# Patient Record
Sex: MALE | Race: WHITE | NOT HISPANIC OR LATINO | URBAN - METROPOLITAN AREA
[De-identification: names, ages, dates, MRNs, and addresses within clinical notes are randomized per-mention and may not be internally consistent; named-entity substitution may affect disease eponyms.]

---

## 2017-05-03 ENCOUNTER — OFFICE VISIT - HEALTHEAST (OUTPATIENT)
Dept: CARDIOLOGY | Facility: CLINIC | Age: 62
End: 2017-05-03

## 2017-05-03 DIAGNOSIS — I48.3 TYPICAL ATRIAL FLUTTER (H): ICD-10-CM

## 2017-05-03 DIAGNOSIS — N52.39 POST-PROCEDURAL ERECTILE DYSFUNCTION, UNSPECIFIED TYPE: ICD-10-CM

## 2017-05-03 DIAGNOSIS — I95.1 ORTHOSTATIC HYPOTENSION: ICD-10-CM

## 2017-05-03 ASSESSMENT — MIFFLIN-ST. JEOR: SCORE: 1682.1

## 2017-05-04 ENCOUNTER — COMMUNICATION - HEALTHEAST (OUTPATIENT)
Dept: CARDIOLOGY | Facility: CLINIC | Age: 62
End: 2017-05-04

## 2017-05-04 LAB
ATRIAL RATE - MUSE: 326 BPM
DIASTOLIC BLOOD PRESSURE - MUSE: NORMAL MMHG
INTERPRETATION ECG - MUSE: NORMAL
P AXIS - MUSE: NORMAL DEGREES
PR INTERVAL - MUSE: NORMAL MS
QRS DURATION - MUSE: 110 MS
QT - MUSE: 388 MS
QTC - MUSE: 427 MS
R AXIS - MUSE: 44 DEGREES
SYSTOLIC BLOOD PRESSURE - MUSE: NORMAL MMHG
T AXIS - MUSE: 69 DEGREES
VENTRICULAR RATE- MUSE: 73 BPM

## 2017-05-08 ENCOUNTER — COMMUNICATION - HEALTHEAST (OUTPATIENT)
Dept: CARDIOLOGY | Facility: CLINIC | Age: 62
End: 2017-05-08

## 2017-05-15 ENCOUNTER — HOSPITAL ENCOUNTER (OUTPATIENT)
Dept: CARDIOLOGY | Facility: HOSPITAL | Age: 62
Discharge: HOME | End: 2017-05-15
Attending: INTERNAL MEDICINE

## 2017-05-15 DIAGNOSIS — I95.1 ORTHOSTATIC HYPOTENSION: ICD-10-CM

## 2017-05-15 DIAGNOSIS — I48.3 TYPICAL ATRIAL FLUTTER (H): ICD-10-CM

## 2017-05-15 LAB
AORTIC ROOT: 3.7 CM
AORTIC VALVE MEAN VELOCITY: 53.6 CM/S
AV DIMENSIONLESS INDEX VTI: 0.8
AV MEAN GRADIENT: 1 MMHG
AV PEAK GRADIENT: 2.4 MMHG
AV VALVE AREA: 2.5 CM2
AV VELOCITY RATIO: 0.8
BSA FOR ECHO PROCEDURE: 2.08 M2
CV BLOOD PRESSURE: NORMAL MMHG
CV ECHO HEIGHT: 75 IN
CV ECHO WEIGHT: 180 LBS
DOP CALC AO PEAK VEL: 76.8 CM/S
DOP CALC AO VTI: 17.2 CM
DOP CALC LVOT AREA: 3.14 CM2
DOP CALC LVOT DIAMETER: 2 CM
DOP CALC LVOT PEAK VEL: 61.3 CM/S
DOP CALC LVOT STROKE VOLUME: 43.3 CM3
DOP CALCLVOT PEAK VEL VTI: 13.8 CM
ECHO EJECTION FRACTION ESTIMATED: 50 %
EJECTION FRACTION: 51 % (ref 55–75)
FRACTIONAL SHORTENING: 26.8 % (ref 28–44)
INTERVENTRICULAR SEPTUM IN END DIASTOLE: 0.99 CM (ref 0.6–1)
IVS/PW RATIO: 0.9
LA AREA 1: 15 CM2
LA AREA 2: 17 CM2
LEFT ATRIUM LENGTH: 4.1 CM
LEFT ATRIUM SIZE: 2.5 CM
LEFT ATRIUM TO AORTIC ROOT RATIO: 0.68 NO UNITS
LEFT ATRIUM VOLUME INDEX: 25.4 ML/M2
LEFT ATRIUM VOLUME: 52.9 CM3
LEFT VENTRICLE CARDIAC INDEX: 1.3 L/MIN/M2
LEFT VENTRICLE CARDIAC OUTPUT: 2.7 L/MIN
LEFT VENTRICLE DIASTOLIC VOLUME INDEX: 53.4 CM3/M2 (ref 34–74)
LEFT VENTRICLE DIASTOLIC VOLUME: 111 CM3 (ref 62–150)
LEFT VENTRICLE HEART RATE: 62 BPM
LEFT VENTRICLE MASS INDEX: 70.5 G/M2
LEFT VENTRICLE SYSTOLIC VOLUME INDEX: 26.3 CM3/M2 (ref 11–31)
LEFT VENTRICLE SYSTOLIC VOLUME: 54.7 CM3 (ref 21–61)
LEFT VENTRICULAR INTERNAL DIMENSION IN DIASTOLE: 4.21 CM (ref 4.2–5.8)
LEFT VENTRICULAR INTERNAL DIMENSION IN SYSTOLE: 3.08 CM (ref 2.5–4)
LEFT VENTRICULAR MASS: 146.7 G
LEFT VENTRICULAR OUTFLOW TRACT MEAN GRADIENT: 1 MMHG
LEFT VENTRICULAR OUTFLOW TRACT MEAN VELOCITY: 40.4 CM/S
LEFT VENTRICULAR OUTFLOW TRACT PEAK GRADIENT: 1 MMHG
LEFT VENTRICULAR POSTERIOR WALL IN END DIASTOLE: 1.1 CM (ref 0.6–1)
LV STROKE VOLUME INDEX: 20.8 ML/M2
MITRAL VALVE E/A RATIO: 1.3
MV AVERAGE E/E' RATIO: 7.2 CM/S
MV DECELERATION TIME: 187 MS
MV E'TISSUE VEL-LAT: 9.96 CM/S
MV E'TISSUE VEL-MED: 8.7 CM/S
MV LATERAL E/E' RATIO: 6.7
MV MEDIAL E/E' RATIO: 7.7
MV PEAK A VELOCITY: 53.4 CM/S
MV PEAK E VELOCITY: 66.9 CM/S
NUC REST DIASTOLIC VOLUME INDEX: 2880 LBS
NUC REST SYSTOLIC VOLUME INDEX: 75 IN
RIGHT VENTRICULAR INTERNAL DIMENSION IN DYSTOLE: 2.43 CM
TRICUSPID REGURGITATION PEAK PRESSURE GRADIENT: 12.1 MMHG
TRICUSPID VALVE ANULAR PLANE SYSTOLIC EXCURSION: 2.3 CM
TRICUSPID VALVE PEAK REGURGITANT VELOCITY: 174 CM/S

## 2017-05-15 ASSESSMENT — MIFFLIN-ST. JEOR: SCORE: 1682.1

## 2017-07-07 ENCOUNTER — OFFICE VISIT - HEALTHEAST (OUTPATIENT)
Dept: CARDIOLOGY | Facility: CLINIC | Age: 62
End: 2017-07-07

## 2017-07-07 DIAGNOSIS — M17.12 PRIMARY OSTEOARTHRITIS OF LEFT KNEE: ICD-10-CM

## 2017-07-07 DIAGNOSIS — I48.0 PAROXYSMAL ATRIAL FIBRILLATION (H): ICD-10-CM

## 2017-07-07 LAB
ATRIAL RATE - MUSE: 61 BPM
DIASTOLIC BLOOD PRESSURE - MUSE: NORMAL MMHG
INTERPRETATION ECG - MUSE: NORMAL
P AXIS - MUSE: 64 DEGREES
PR INTERVAL - MUSE: 190 MS
QRS DURATION - MUSE: 110 MS
QT - MUSE: 414 MS
QTC - MUSE: 416 MS
R AXIS - MUSE: 41 DEGREES
SYSTOLIC BLOOD PRESSURE - MUSE: NORMAL MMHG
T AXIS - MUSE: 65 DEGREES
VENTRICULAR RATE- MUSE: 61 BPM

## 2017-07-07 ASSESSMENT — MIFFLIN-ST. JEOR: SCORE: 1677.57

## 2017-12-18 ENCOUNTER — RECORDS - HEALTHEAST (OUTPATIENT)
Dept: LAB | Facility: CLINIC | Age: 62
End: 2017-12-18

## 2017-12-18 LAB — PSA SERPL-MCNC: 0.3 NG/ML (ref 0–4.5)

## 2017-12-22 RX ORDER — ALBUTEROL SULFATE 90 UG/1
2 AEROSOL, METERED RESPIRATORY (INHALATION) 4 TIMES DAILY PRN
COMMUNITY

## 2017-12-22 RX ORDER — VALACYCLOVIR HYDROCHLORIDE 500 MG/1
500 TABLET, FILM COATED ORAL EVERY 12 HOURS PRN
COMMUNITY

## 2017-12-26 ENCOUNTER — APPOINTMENT (OUTPATIENT)
Dept: GENERAL RADIOLOGY | Facility: CLINIC | Age: 62
End: 2017-12-26
Attending: PODIATRIST
Payer: COMMERCIAL

## 2017-12-26 ENCOUNTER — ANESTHESIA (OUTPATIENT)
Dept: SURGERY | Facility: CLINIC | Age: 62
End: 2017-12-26
Payer: COMMERCIAL

## 2017-12-26 ENCOUNTER — HOSPITAL ENCOUNTER (OUTPATIENT)
Facility: CLINIC | Age: 62
Discharge: HOME | End: 2017-12-26
Attending: PODIATRIST | Admitting: PODIATRIST
Payer: COMMERCIAL

## 2017-12-26 ENCOUNTER — ANESTHESIA EVENT (OUTPATIENT)
Dept: SURGERY | Facility: CLINIC | Age: 62
End: 2017-12-26
Payer: COMMERCIAL

## 2017-12-26 VITALS
HEART RATE: 53 BPM | DIASTOLIC BLOOD PRESSURE: 78 MMHG | HEIGHT: 74 IN | BODY MASS INDEX: 22.6 KG/M2 | SYSTOLIC BLOOD PRESSURE: 109 MMHG | RESPIRATION RATE: 16 BRPM | TEMPERATURE: 95.7 F | OXYGEN SATURATION: 100 % | WEIGHT: 176.1 LBS

## 2017-12-26 PROCEDURE — 36000060 ZZH SURGERY LEVEL 3 W FLUORO 1ST 30 MIN: Performed by: PODIATRIST

## 2017-12-26 PROCEDURE — 37000009 ZZH ANESTHESIA TECHNICAL FEE, EACH ADDTL 15 MIN: Performed by: PODIATRIST

## 2017-12-26 PROCEDURE — 25000125 ZZHC RX 250: Performed by: PODIATRIST

## 2017-12-26 PROCEDURE — 40000277 XR SURGERY CARM FLUORO LESS THAN 5 MIN W STILLS

## 2017-12-26 PROCEDURE — 71000012 ZZH RECOVERY PHASE 1 LEVEL 1 FIRST HR: Performed by: PODIATRIST

## 2017-12-26 PROCEDURE — 25000128 H RX IP 250 OP 636: Performed by: PODIATRIST

## 2017-12-26 PROCEDURE — 40000986 XR FOOT PORT LT 3 VW: Mod: LT

## 2017-12-26 PROCEDURE — 27210995 ZZH RX 272: Performed by: PODIATRIST

## 2017-12-26 PROCEDURE — 37000008 ZZH ANESTHESIA TECHNICAL FEE, 1ST 30 MIN: Performed by: PODIATRIST

## 2017-12-26 PROCEDURE — 27210794 ZZH OR GENERAL SUPPLY STERILE: Performed by: PODIATRIST

## 2017-12-26 PROCEDURE — 40000170 ZZH STATISTIC PRE-PROCEDURE ASSESSMENT II: Performed by: PODIATRIST

## 2017-12-26 PROCEDURE — 36000058 ZZH SURGERY LEVEL 3 EA 15 ADDTL MIN: Performed by: PODIATRIST

## 2017-12-26 PROCEDURE — 71000013 ZZH RECOVERY PHASE 1 LEVEL 1 EA ADDTL HR: Performed by: PODIATRIST

## 2017-12-26 PROCEDURE — 27110028 ZZH OR GENERAL SUPPLY NON-STERILE: Performed by: PODIATRIST

## 2017-12-26 PROCEDURE — 25000132 ZZH RX MED GY IP 250 OP 250 PS 637: Performed by: ANESTHESIOLOGY

## 2017-12-26 PROCEDURE — 71000027 ZZH RECOVERY PHASE 2 EACH 15 MINS: Performed by: PODIATRIST

## 2017-12-26 PROCEDURE — 25000128 H RX IP 250 OP 636: Performed by: NURSE ANESTHETIST, CERTIFIED REGISTERED

## 2017-12-26 RX ORDER — FENTANYL CITRATE 50 UG/ML
25-50 INJECTION, SOLUTION INTRAMUSCULAR; INTRAVENOUS EVERY 5 MIN PRN
Status: DISCONTINUED | OUTPATIENT
Start: 2017-12-26 | End: 2017-12-26 | Stop reason: HOSPADM

## 2017-12-26 RX ORDER — HYDROMORPHONE HYDROCHLORIDE 1 MG/ML
.3-.5 INJECTION, SOLUTION INTRAMUSCULAR; INTRAVENOUS; SUBCUTANEOUS EVERY 10 MIN PRN
Status: DISCONTINUED | OUTPATIENT
Start: 2017-12-26 | End: 2017-12-26 | Stop reason: HOSPADM

## 2017-12-26 RX ORDER — ACETAMINOPHEN 500 MG
1000 TABLET ORAL ONCE
Status: COMPLETED | OUTPATIENT
Start: 2017-12-26 | End: 2017-12-26

## 2017-12-26 RX ORDER — ONDANSETRON 4 MG/1
4 TABLET, ORALLY DISINTEGRATING ORAL EVERY 30 MIN PRN
Status: DISCONTINUED | OUTPATIENT
Start: 2017-12-26 | End: 2017-12-26 | Stop reason: HOSPADM

## 2017-12-26 RX ORDER — PROPOFOL 10 MG/ML
INJECTION, EMULSION INTRAVENOUS CONTINUOUS PRN
Status: DISCONTINUED | OUTPATIENT
Start: 2017-12-26 | End: 2017-12-26

## 2017-12-26 RX ORDER — ONDANSETRON 2 MG/ML
4 INJECTION INTRAMUSCULAR; INTRAVENOUS EVERY 30 MIN PRN
Status: DISCONTINUED | OUTPATIENT
Start: 2017-12-26 | End: 2017-12-26 | Stop reason: HOSPADM

## 2017-12-26 RX ORDER — BUPIVACAINE HYDROCHLORIDE AND EPINEPHRINE 5; 5 MG/ML; UG/ML
INJECTION, SOLUTION PERINEURAL PRN
Status: DISCONTINUED | OUTPATIENT
Start: 2017-12-26 | End: 2017-12-26 | Stop reason: HOSPADM

## 2017-12-26 RX ORDER — FENTANYL CITRATE 50 UG/ML
INJECTION, SOLUTION INTRAMUSCULAR; INTRAVENOUS PRN
Status: DISCONTINUED | OUTPATIENT
Start: 2017-12-26 | End: 2017-12-26

## 2017-12-26 RX ORDER — MAGNESIUM HYDROXIDE 1200 MG/15ML
LIQUID ORAL PRN
Status: DISCONTINUED | OUTPATIENT
Start: 2017-12-26 | End: 2017-12-26 | Stop reason: HOSPADM

## 2017-12-26 RX ORDER — PROPOFOL 10 MG/ML
INJECTION, EMULSION INTRAVENOUS PRN
Status: DISCONTINUED | OUTPATIENT
Start: 2017-12-26 | End: 2017-12-26

## 2017-12-26 RX ORDER — NALOXONE HYDROCHLORIDE 0.4 MG/ML
.1-.4 INJECTION, SOLUTION INTRAMUSCULAR; INTRAVENOUS; SUBCUTANEOUS
Status: DISCONTINUED | OUTPATIENT
Start: 2017-12-26 | End: 2017-12-26 | Stop reason: HOSPADM

## 2017-12-26 RX ORDER — SODIUM CHLORIDE, SODIUM LACTATE, POTASSIUM CHLORIDE, CALCIUM CHLORIDE 600; 310; 30; 20 MG/100ML; MG/100ML; MG/100ML; MG/100ML
INJECTION, SOLUTION INTRAVENOUS CONTINUOUS
Status: DISCONTINUED | OUTPATIENT
Start: 2017-12-26 | End: 2017-12-26 | Stop reason: HOSPADM

## 2017-12-26 RX ORDER — SODIUM CHLORIDE, SODIUM LACTATE, POTASSIUM CHLORIDE, CALCIUM CHLORIDE 600; 310; 30; 20 MG/100ML; MG/100ML; MG/100ML; MG/100ML
INJECTION, SOLUTION INTRAVENOUS CONTINUOUS PRN
Status: DISCONTINUED | OUTPATIENT
Start: 2017-12-26 | End: 2017-12-26

## 2017-12-26 RX ORDER — PHYSOSTIGMINE SALICYLATE 1 MG/ML
1.2 INJECTION INTRAVENOUS
Status: DISCONTINUED | OUTPATIENT
Start: 2017-12-26 | End: 2017-12-26 | Stop reason: HOSPADM

## 2017-12-26 RX ORDER — FENTANYL CITRATE 50 UG/ML
25-50 INJECTION, SOLUTION INTRAMUSCULAR; INTRAVENOUS
Status: DISCONTINUED | OUTPATIENT
Start: 2017-12-26 | End: 2017-12-26 | Stop reason: HOSPADM

## 2017-12-26 RX ORDER — MEPERIDINE HYDROCHLORIDE 25 MG/ML
12.5 INJECTION INTRAMUSCULAR; INTRAVENOUS; SUBCUTANEOUS
Status: DISCONTINUED | OUTPATIENT
Start: 2017-12-26 | End: 2017-12-26 | Stop reason: HOSPADM

## 2017-12-26 RX ORDER — CEFAZOLIN SODIUM 2 G/100ML
2 INJECTION, SOLUTION INTRAVENOUS
Status: COMPLETED | OUTPATIENT
Start: 2017-12-26 | End: 2017-12-26

## 2017-12-26 RX ORDER — ONDANSETRON 2 MG/ML
INJECTION INTRAMUSCULAR; INTRAVENOUS PRN
Status: DISCONTINUED | OUTPATIENT
Start: 2017-12-26 | End: 2017-12-26

## 2017-12-26 RX ORDER — LIDOCAINE HYDROCHLORIDE 10 MG/ML
INJECTION, SOLUTION INFILTRATION; PERINEURAL
Status: DISCONTINUED
Start: 2017-12-26 | End: 2017-12-26 | Stop reason: WASHOUT

## 2017-12-26 RX ORDER — BUPIVACAINE HYDROCHLORIDE AND EPINEPHRINE 5; 5 MG/ML; UG/ML
INJECTION, SOLUTION EPIDURAL; INTRACAUDAL; PERINEURAL
Status: DISCONTINUED
Start: 2017-12-26 | End: 2017-12-26 | Stop reason: HOSPADM

## 2017-12-26 RX ADMIN — PROPOFOL 100 MCG/KG/MIN: 10 INJECTION, EMULSION INTRAVENOUS at 09:28

## 2017-12-26 RX ADMIN — FENTANYL CITRATE 50 MCG: 50 INJECTION, SOLUTION INTRAMUSCULAR; INTRAVENOUS at 09:28

## 2017-12-26 RX ADMIN — ACETAMINOPHEN 1000 MG: 500 TABLET, FILM COATED ORAL at 11:35

## 2017-12-26 RX ADMIN — PROPOFOL 20 MG: 10 INJECTION, EMULSION INTRAVENOUS at 09:28

## 2017-12-26 RX ADMIN — SODIUM CHLORIDE, POTASSIUM CHLORIDE, SODIUM LACTATE AND CALCIUM CHLORIDE: 600; 310; 30; 20 INJECTION, SOLUTION INTRAVENOUS at 09:26

## 2017-12-26 RX ADMIN — PROPOFOL 50 MCG/KG/MIN: 10 INJECTION, EMULSION INTRAVENOUS at 10:15

## 2017-12-26 RX ADMIN — MIDAZOLAM 2 MG: 1 INJECTION INTRAMUSCULAR; INTRAVENOUS at 09:28

## 2017-12-26 RX ADMIN — CEFAZOLIN SODIUM 2 G: 2 INJECTION, SOLUTION INTRAVENOUS at 09:33

## 2017-12-26 RX ADMIN — ONDANSETRON 4 MG: 2 INJECTION INTRAMUSCULAR; INTRAVENOUS at 10:21

## 2017-12-26 ASSESSMENT — ENCOUNTER SYMPTOMS: DYSRHYTHMIAS: 1

## 2017-12-26 NOTE — ANESTHESIA CARE TRANSFER NOTE
Patient: Jesus Hendricks    Procedure(s):  EXCISION TIBIAL SESAMOID LEFT FOOT   - Wound Class: I-Clean    Diagnosis: TIBIAL SESAMOIDITIS LEFT FOOT  Diagnosis Additional Information: No value filed.    Anesthesia Type:   MAC     Note:  Airway :Face Mask  Patient transferred to:PACU  Comments: Transferred to PACU, spontaneous respirations, 10L oxygen via facemask.  All monitors and alarms on and functioning, VSS.  Patient awake, comfortable.  Report to PACU RN.Handoff Report: Identifed the Patient, Identified the Reponsible Provider, Reviewed the pertinent medical history, Discussed the surgical course, Reviewed Intra-OP anesthesia mangement and issues during anesthesia, Set expectations for post-procedure period and Allowed opportunity for questions and acknowledgement of understanding      Vitals: (Last set prior to Anesthesia Care Transfer)    CRNA VITALS  12/26/2017 0958 - 12/26/2017 1034      12/26/2017             Pulse: (!)  48    SpO2: 98 %    Resp Rate (set): 10                Electronically Signed By: JIM Carvalho CRNA  December 26, 2017  10:34 AM

## 2017-12-26 NOTE — IP AVS SNAPSHOT
MRN:[id number]                      After Visit Summary   12/26/2017    Jesus Hendricks    MRN: [id number]           Thank you!     Thank you for choosing [facility] for your care. Our goal is always to provide you with excellent care. Hearing back from our patients is one way we can continue to improve our services. Please take a few minutes to complete the written survey that you may receive in the mail after you visit with us. Thank you!        Patient Information     Date Of Birth          1955        About your hospital stay     You were admitted on:  December 26, 2017 You last received care in the:  [facility] Same Day Surgery    You were discharged on:  December 26, 2017       Who to Call     For medical emergencies, please call 911.  For non-urgent questions about your medical care, please call your primary care provider or clinic, [phone]  For questions related to your surgery, please call your surgery clinic        Attending Provider     Provider Candi Claudio DPM Podiatry       Primary Care Provider Office Phone # Fax #    Piero Chaves [phone]      After Care Instructions     Discharge Instructions       Do minimal walking for the first week; where surgical shoe and leave bandages clean, dry and intact. Use ice for twenty minutes every hour. Bear weight only on heel for the first three days. Take pain medication before pain begins.    Call my cell phone with any questions or concerns: [phone]  Call the office to schedule a follow up for one week: [phone]            Elevate affected extremity           Ice to affected area       Ice pack to affected area PRN (as needed).                  Further instructions from your care team       Same Day Surgery Discharge Instructions for  Sedation and General Anesthesia       It's not unusual to feel dizzy, light-headed or faint for up to 24 hours after surgery or while  taking pain medication.  If you have these symptoms: sit for a few minutes before standing and have someone assist you when you get up to walk or use the bathroom.      You should rest and relax for the next 24 hours. We recommend you make arrangements to have an adult stay with you for at least 24 hours after your discharge.  Avoid hazardous and strenuous activity.      DO NOT DRIVE any vehicle or operate mechanical equipment for 24 hours following the end of your surgery.  Even though you may feel normal, your reactions may be affected by the medication you have received.      Do not drink alcoholic beverages for 24 hours following surgery.       Slowly progress to your regular diet as you feel able. It's not unusual to feel nauseated and/or vomit after receiving anesthesia.  If you develop these symptoms, drink clear liquids (apple juice, ginger ale, broth, 7-up, etc. ) until you feel better.  If your nausea and vomiting persists for 24 hours, please notify your surgeon.        All narcotic pain medications, along with inactivity and anesthesia, can cause constipation. Drinking plenty of liquids and increasing fiber intake will help.      For any questions of a medical nature, call your surgeon.      Do not make important decisions for 24 hours.      If you had general anesthesia, you may have a sore throat for a couple of days related to the breathing tube used during surgery.  You may use Cepacol lozenges to help with this discomfort.  If it worsens or if you develop a fever, contact your surgeon.       If you feel your pain is not well managed with the pain medications prescribed by your surgeon, please contact your surgeon's office to let them know so they can address your concerns.     **If you have questions or concerns about your procedure,  call Dr. Saeed at [phone]**    Reasons to contact your surgeon:    1. Signs of possible infection: Check your incision daily for redness, swelling, warmth,  "red streaks or foul drainage.   2. Elevated temperature.  3. Pain not controlled with pain medication and/or rest.   4. Uncontrolled nausea or vomiting.  5. Any questions or concerns.   6.     While you were at the hospital today you were given 1000 mg of Tylenol at 11:35 . The recommended daily maximum dose is 4000 mg.      Pending Results     Date and Time Order Name Status Description    2017 1101 XR Foot Port Left 3 Views In process     2017 1035 XR Surgery BOYD L/T 5 Min Fluoro w Stills In process     2017 0000 EKG CARDIAC - HIM SCAN Preliminary             Admission Information     Date & Time Provider Department Dept. Phone    2017 Candi Saeed DPM [facility] Same Day Surgery [phone]      Your Vitals Were     Blood Pressure Pulse Temperature Respirations Height Weight    100/72 53 94.6  F (34.8  C) 12 1.88 m (6' 2\") 79.9 kg (176 lb 1.6 oz)    Pulse Oximetry BMI (Body Mass Index)                100% 22.61 kg/m2          Orgdot Information     Orgdot lets you send messages to your doctor, view your test results, renew your prescriptions, schedule appointments and more. To sign up, go to [email or web address] . Click on \"Log in\" on the left side of the screen, which will take you to the Welcome page. Then click on \"Sign up Now\" on the right side of the page.     You will be asked to enter the access code listed below, as well as some personal information. Please follow the directions to create your username and password.     Your access code is: [email or web address]  Expires: 3/26/2018 10:58 AM     Your access code will  in 90 days. If you need help or a new code, please call your [facility] clinic or [phone].        Care EveryWhere ID     This is your Care EveryWhere ID. This could be used by other organizations to access your [facility] medical records  [id number]        Equal Access to Services     LAUREN FRAZIER AH: pamela Drew " annaginny mynor moran, dinora mancini. So [facility] [phone].    ATENCIÓN: Si brennen wallace, tiene a sheridan disposición servicios gratuitos de asistencia lingüística. Alyce al [phone].    We comply with applicable federal civil rights laws and [place] laws. We do not discriminate on the basis of race, color, national origin, age, disability, sex, sexual orientation, or gender identity.               Review of your medicines      UNREVIEWED medicines. Ask your doctor about these medicines        Dose / Directions    ZOLPIDEM TARTRATE PO        Dose:  10 mg   Take 10 mg by mouth nightly as needed for sleep   Refills:  0         CONTINUE these medicines which have NOT CHANGED        Dose / Directions    albuterol 108 (90 BASE) MCG/ACT Inhaler   Commonly known as:  PROAIR HFA/PROVENTIL HFA/VENTOLIN HFA        Dose:  2 puff   Inhale 2 puffs into the lungs 4 times daily as needed for shortness of breath / dyspnea or wheezing   Refills:  0       SILDENAFIL CITRATE PO        Dose:   mg   Take  mg by mouth daily as needed   Refills:  0       valACYclovir 500 MG tablet   Commonly known as:  VALTREX        Dose:  500 mg   Take 500 mg by mouth every 12 hours as needed   Refills:  0       VITAMIN D (CHOLECALCIFEROL) PO        Dose:  1000 Units   Take 1,000 Units by mouth daily   Refills:  0       ZOMIG PO        Dose:  5 mg   Take 5 mg by mouth 2 times daily as needed for migraine   Refills:  0               ANTIBIOTIC INSTRUCTION     You've Been Prescribed an Antibiotic - Now What?  Your healthcare team thinks that you or your loved one might have an infection. Some infections can be treated with antibiotics, which are powerful, life-saving drugs. Like all medications, antibiotics have side effects and should only be used when necessary. There are some important things you should know about your antibiotic treatment.      Your healthcare team may run tests before you start  taking an antibiotic.    Your team may take samples (e.g., from your blood, urine or other areas) to run tests to look for bacteria. These test can be important to determine if you need an antibiotic at all and, if you do, which antibiotic will work best.      Within a few days, your healthcare team might change or even stop your antibiotic.    Your team may start you on an antibiotic while they are working to find out what is making you sick.    Your team might change your antibiotic because test results show that a different antibiotic would be better to treat your infection.    In some cases, once your team has more information, they learn that you do not need an antibiotic at all. They may find out that you don't have an infection, or that the antibiotic you're taking won't work against your infection. For example, an infection caused by a virus can't be treated with antibiotics. Staying on an antibiotic when you don't need it is more likely to be harmful than helpful.      You may experience side effects from your antibiotic.    Like all medications, antibiotics have side effects. Some of these can be serious.    Let you healthcare team know if you have any known allergies when you are admitted to the hospital.    One significant side effect of nearly all antibiotics is the risk of severe and sometimes deadly diarrhea caused by Clostridium difficile (C. Difficile). This occurs when a person takes antibiotics because some good germs are destroyed. Antibiotic use allows C. diificile to take over, putting patients at high risk for this serious infection.    As a patient or caregiver, it is important to understand your or your loved one's antibiotic treatment. It is especially important for caregivers to speak up when patients can't speak for themselves. Here are some important questions to ask your healthcare team.    What infection is this antibiotic treating and how do you know I have that infection?    What  side effects might occur from this antibiotic?    How long will I need to take this antibiotic?    Is it safe to take this antibiotic with other medications or supplements (e.g., vitamins) that I am taking?     Are there any special directions I need to know about taking this antibiotic? For example, should I take it with food?    How will I be monitored to know whether my infection is responding to the antibiotic?    What tests may help to make sure the right antibiotic is prescribed for me?      Information provided by:  www.cdc.gov/getsmart  U.S. Department of Health and Human Services  Centers for disease Control and Prevention  National Center for Emerging and Zoonotic Infectious Diseases  Division of Healthcare Quality Promotion         Protect others around you: Learn how to safely use, store and throw away your medicines at www.disposemymeds.org.             Medication List: This is a list of all your medications and when to take them. Check marks below indicate your daily home schedule. Keep this list as a reference.      Medications           Morning Afternoon Evening Bedtime As Needed    albuterol 108 (90 BASE) MCG/ACT Inhaler   Commonly known as:  PROAIR HFA/PROVENTIL HFA/VENTOLIN HFA   Inhale 2 puffs into the lungs 4 times daily as needed for shortness of breath / dyspnea or wheezing                                SILDENAFIL CITRATE PO   Take  mg by mouth daily as needed                                valACYclovir 500 MG tablet   Commonly known as:  VALTREX   Take 500 mg by mouth every 12 hours as needed                                VITAMIN D (CHOLECALCIFEROL) PO   Take 1,000 Units by mouth daily                                ZOLPIDEM TARTRATE PO   Take 10 mg by mouth nightly as needed for sleep                                ZOMIG PO   Take 5 mg by mouth 2 times daily as needed for migraine

## 2017-12-26 NOTE — OR NURSING
Patient states that Dr. Saeed was going to send prescription for injectable blood thinner to [facility] on [address]. Contacted Dr. Saeed, she stated she will send from her office, she is there now. Relayed this information to patient and family member.

## 2017-12-26 NOTE — ANESTHESIA POSTPROCEDURE EVALUATION
Patient: Jesus Hendricks    Procedure(s):  EXCISION TIBIAL SESAMOID LEFT FOOT   - Wound Class: I-Clean    Diagnosis:TIBIAL SESAMOIDITIS LEFT FOOT  Diagnosis Additional Information: No value filed.    Anesthesia Type:  MAC    Note:  Anesthesia Post Evaluation    Patient location during evaluation: PACU  Patient participation: Able to fully participate in evaluation  Level of consciousness: awake  Pain management: adequate  Airway patency: patent  Cardiovascular status: acceptable  Respiratory status: acceptable  Hydration status: acceptable  PONV: controlled     Anesthetic complications: None          Last vitals:  Vitals:    12/26/17 1031 12/26/17 1045 12/26/17 1100   BP: 106/73 111/76 100/72   Pulse:      Resp: 10 10 12   Temp: 36.6  C (97.9  F) 35  C (95  F) 34.8  C (94.6  F)   SpO2: 100% 100% 100%         Electronically Signed By: Kendrick Huff MD  December 26, 2017  11:10 AM

## 2017-12-26 NOTE — OR NURSING
PNDS met, po per I&O sheet. Pt dressed, up in recliner and transported to Phase 2. Post op shoe on

## 2017-12-26 NOTE — ANESTHESIA PREPROCEDURE EVALUATION
Anesthesia Evaluation     . Pt has had prior anesthetic. Type: General           ROS/MED HX    ENT/Pulmonary:       Neurologic:     (+)migraines,     Cardiovascular:     (+) ----. : . . . :. dysrhythmias a-fib, .       METS/Exercise Tolerance:     Hematologic:         Musculoskeletal:         GI/Hepatic:         Renal/Genitourinary:         Endo:         Psychiatric:         Infectious Disease:         Malignancy:         Other:                                    Anesthesia Plan      History & Physical Review  History and physical reviewed and following examination; no interval change.    ASA Status:  2 .        Plan for MAC with Intravenous induction. Maintenance will be TIVA.    PONV prophylaxis:  Ondansetron (or other 5HT-3) and Dexamethasone or Solumedrol  Propofol, Zofran, and decadron      Postoperative Care  Postoperative pain management:  IV analgesics and Oral pain medications.      Consents  Anesthetic plan, risks, benefits and alternatives discussed with:  Patient..                          .

## 2017-12-26 NOTE — IP AVS SNAPSHOT
[facility] Same Day Surgery    [address]    [place] [address]    Phone:  [phone]    Fax:  [phone]                                       After Visit Summary   12/26/2017    Jesus Hendricks    MRN: [id number]           After Visit Summary Signature Page     I have received my discharge instructions, and my questions have been answered. I have discussed any challenges I see with this plan with the nurse or doctor.    ..........................................................................................................................................  Patient/Patient Representative Signature      ..........................................................................................................................................  Patient Representative Print Name and Relationship to Patient    ..................................................               ................................................  Date                                            Time    ..........................................................................................................................................  Reviewed by Signature/Title    ...................................................              ..............................................  Date                                                            Time

## 2017-12-26 NOTE — DISCHARGE INSTRUCTIONS
Same Day Surgery Discharge Instructions for  Sedation and General Anesthesia       It's not unusual to feel dizzy, light-headed or faint for up to 24 hours after surgery or while taking pain medication.  If you have these symptoms: sit for a few minutes before standing and have someone assist you when you get up to walk or use the bathroom.      You should rest and relax for the next 24 hours. We recommend you make arrangements to have an adult stay with you for at least 24 hours after your discharge.  Avoid hazardous and strenuous activity.      DO NOT DRIVE any vehicle or operate mechanical equipment for 24 hours following the end of your surgery.  Even though you may feel normal, your reactions may be affected by the medication you have received.      Do not drink alcoholic beverages for 24 hours following surgery.       Slowly progress to your regular diet as you feel able. It's not unusual to feel nauseated and/or vomit after receiving anesthesia.  If you develop these symptoms, drink clear liquids (apple juice, ginger ale, broth, 7-up, etc. ) until you feel better.  If your nausea and vomiting persists for 24 hours, please notify your surgeon.        All narcotic pain medications, along with inactivity and anesthesia, can cause constipation. Drinking plenty of liquids and increasing fiber intake will help.      For any questions of a medical nature, call your surgeon.      Do not make important decisions for 24 hours.      If you had general anesthesia, you may have a sore throat for a couple of days related to the breathing tube used during surgery.  You may use Cepacol lozenges to help with this discomfort.  If it worsens or if you develop a fever, contact your surgeon.       If you feel your pain is not well managed with the pain medications prescribed by your surgeon, please contact your surgeon's office to let them know so they can address your concerns.     **If you have questions or concerns about your  procedure,  call Dr. Saeed at [phone]**    Reasons to contact your surgeon:    1. Signs of possible infection: Check your incision daily for redness, swelling, warmth, red streaks or foul drainage.   2. Elevated temperature.  3. Pain not controlled with pain medication and/or rest.   4. Uncontrolled nausea or vomiting.  5. Any questions or concerns.   6.     While you were at the hospital today you were given 1000 mg of Tylenol at 11:35 . The recommended daily maximum dose is 4000 mg.

## 2017-12-27 NOTE — OP NOTE
DATE OF PROCEDURE:  12/26/2017       PREOPERATIVE DIAGNOSIS:  Tibial sesamoiditis, left foot.      POSTOPERATIVE DIAGNOSIS:  Tibial sesamoiditis, left foot.      PROCEDURE:  Excision tibial sesamoid, left foot.      ANESTHESIA:  Monitored anesthesia care with local anesthetic consisting of 10 mL of 0.5% Marcaine with epinephrine.      ESTIMATED BLOOD LOSS:  1  mL.      HEMOSTASIS:  Pneumatic ankle tourniquet at 250 mmHg for 43 minutes.      MATERIALS:  3-0 Vicryl, 4-0 Prolene.      INJECTABLES:  An additional 10 mL of 0.5% Marcaine with epinephrine were injected.      COMPLICATIONS:  None.      INDICATIONS FOR PROCEDURE:  Jesus Hendricks is a 62-year-old male with a chronic history of pain at the ball of his left foot, which was first noticed during yoga, being barefooted.  He has a high arch foot and bears weight under the big toe.  There is point tenderness at the location of the tibial sesamoid, and this was confirmed with x-ray.      DESCRIPTION OF PROCEDURE:  The patient was identified and brought into the operating room and placed on the operating table in the supine position.  After the induction of anesthesia, the left foot was scrubbed, prepped and draped in the usual sterile technique.  An approximately 3 cm linear longitudinal incision was made over the area of the medial first metatarsophalangeal joint, located at the tibial sesamoid.  This dissection was taken down through the subcutaneous layers into the fatty area, and at this point the dissection was taken down to the level of the tibial sesamoid.  Using soft tissue dissection, tibial sesamoid was removed from the operative field, and this was confirmed under fluoroscopy.  At this time fluid was passively drained from this area, approximately 2cc of clear pink nonviscous fluid. The site was then irrigated with copious amounts of normal sterile saline.  The subcutaneous layers were closed with 3-0 Vicryl.  The skin was closed with 4-0 nylon.  Dressings  included Xeroform, 4x4 gauze and an Ace bandage.  The patient tolerated the procedures and the anesthesia well and was transported to recovery with vital signs stable and vascular status intact to the left foot.        Following a period of postoperative monitoring, the patient will be discharged home with postoperative instructions and will follow up with my office in 1 week.         BHUPINDER VINCENT DPM             D: 2017 10:31   T: 2017 22:15   MT: [id number]      Name:     DANIEL GALE   MRN:      [id number]        Account:        [id number]   :      1955           Procedure Date: 2017      Document: [id number]

## 2018-02-02 ENCOUNTER — RECORDS - HEALTHEAST (OUTPATIENT)
Dept: LAB | Facility: CLINIC | Age: 63
End: 2018-02-02

## 2018-02-02 LAB
BASOPHILS # BLD AUTO: 0.1 THOU/UL (ref 0–0.2)
BASOPHILS NFR BLD AUTO: 1 % (ref 0–2)
C REACTIVE PROTEIN LHE: <0.1 MG/DL (ref 0–0.8)
EOSINOPHIL # BLD AUTO: 0.2 THOU/UL (ref 0–0.4)
EOSINOPHIL NFR BLD AUTO: 3 % (ref 0–6)
ERYTHROCYTE [DISTWIDTH] IN BLOOD BY AUTOMATED COUNT: 12.3 % (ref 11–14.5)
ERYTHROCYTE [SEDIMENTATION RATE] IN BLOOD BY WESTERGREN METHOD: 2 MM/HR (ref 0–15)
HCT VFR BLD AUTO: 44.7 % (ref 40–54)
HGB BLD-MCNC: 14.7 G/DL (ref 14–18)
LYMPHOCYTES # BLD AUTO: 1.1 THOU/UL (ref 0.8–4.4)
LYMPHOCYTES NFR BLD AUTO: 17 % (ref 20–40)
MCH RBC QN AUTO: 31.1 PG (ref 27–34)
MCHC RBC AUTO-ENTMCNC: 32.9 G/DL (ref 32–36)
MCV RBC AUTO: 95 FL (ref 80–100)
MONOCYTES # BLD AUTO: 0.8 THOU/UL (ref 0–0.9)
MONOCYTES NFR BLD AUTO: 11 % (ref 2–10)
NEUTROPHILS # BLD AUTO: 4.6 THOU/UL (ref 2–7.7)
NEUTROPHILS NFR BLD AUTO: 68 % (ref 50–70)
PLATELET # BLD AUTO: 267 THOU/UL (ref 140–440)
PMV BLD AUTO: 9.9 FL (ref 8.5–12.5)
RBC # BLD AUTO: 4.73 MILL/UL (ref 4.4–6.2)
URATE SERPL-MCNC: 6.1 MG/DL (ref 3–8)
WBC: 6.8 THOU/UL (ref 4–11)

## 2018-06-07 ENCOUNTER — COMMUNICATION - HEALTHEAST (OUTPATIENT)
Dept: CARDIOLOGY | Facility: CLINIC | Age: 63
End: 2018-06-07

## 2018-07-31 ENCOUNTER — RECORDS - HEALTHEAST (OUTPATIENT)
Dept: ADMINISTRATIVE | Facility: OTHER | Age: 63
End: 2018-07-31

## 2018-08-02 ENCOUNTER — OFFICE VISIT - HEALTHEAST (OUTPATIENT)
Dept: CARDIOLOGY | Facility: CLINIC | Age: 63
End: 2018-08-02

## 2018-08-02 DIAGNOSIS — N52.39 POST-PROCEDURAL ERECTILE DYSFUNCTION, UNSPECIFIED TYPE: ICD-10-CM

## 2018-08-02 DIAGNOSIS — I95.1 ORTHOSTATIC HYPOTENSION: ICD-10-CM

## 2018-08-02 DIAGNOSIS — I48.0 PAROXYSMAL ATRIAL FIBRILLATION (H): ICD-10-CM

## 2018-08-02 ASSESSMENT — MIFFLIN-ST. JEOR: SCORE: 1682.1

## 2018-08-08 ENCOUNTER — AMBULATORY - HEALTHEAST (OUTPATIENT)
Dept: CARDIOLOGY | Facility: CLINIC | Age: 63
End: 2018-08-08

## 2018-08-08 ENCOUNTER — HOSPITAL ENCOUNTER (OUTPATIENT)
Dept: CARDIOLOGY | Facility: HOSPITAL | Age: 63
Discharge: HOME | End: 2018-08-08
Attending: INTERNAL MEDICINE

## 2018-08-08 DIAGNOSIS — I48.0 PAROXYSMAL ATRIAL FIBRILLATION (H): ICD-10-CM

## 2018-08-10 ENCOUNTER — COMMUNICATION - HEALTHEAST (OUTPATIENT)
Dept: CARDIOLOGY | Facility: CLINIC | Age: 63
End: 2018-08-10

## 2018-10-02 ENCOUNTER — RECORDS - HEALTHEAST (OUTPATIENT)
Dept: LAB | Facility: CLINIC | Age: 63
End: 2018-10-02

## 2018-10-05 LAB — BACTERIA SPEC CULT: ABNORMAL

## 2018-12-12 ENCOUNTER — RECORDS - HEALTHEAST (OUTPATIENT)
Dept: LAB | Facility: CLINIC | Age: 63
End: 2018-12-12

## 2018-12-12 LAB
ALBUMIN SERPL-MCNC: 3.7 G/DL (ref 3.5–5)
ALP SERPL-CCNC: 66 U/L (ref 45–120)
ALT SERPL W P-5'-P-CCNC: 16 U/L (ref 0–45)
ANION GAP SERPL CALCULATED.3IONS-SCNC: 9 MMOL/L (ref 5–18)
AST SERPL W P-5'-P-CCNC: 24 U/L (ref 0–40)
BASOPHILS # BLD AUTO: 0.1 THOU/UL (ref 0–0.2)
BASOPHILS NFR BLD AUTO: 1 % (ref 0–2)
BILIRUB SERPL-MCNC: 2.3 MG/DL (ref 0–1)
BUN SERPL-MCNC: 13 MG/DL (ref 8–22)
CALCIUM SERPL-MCNC: 9.3 MG/DL (ref 8.5–10.5)
CHLORIDE BLD-SCNC: 105 MMOL/L (ref 98–107)
CHOLEST SERPL-MCNC: 151 MG/DL
CO2 SERPL-SCNC: 25 MMOL/L (ref 22–31)
CREAT SERPL-MCNC: 0.9 MG/DL (ref 0.7–1.3)
EOSINOPHIL # BLD AUTO: 0.1 THOU/UL (ref 0–0.4)
EOSINOPHIL NFR BLD AUTO: 3 % (ref 0–6)
ERYTHROCYTE [DISTWIDTH] IN BLOOD BY AUTOMATED COUNT: 12.2 % (ref 11–14.5)
FASTING STATUS PATIENT QL REPORTED: NORMAL
GFR SERPL CREATININE-BSD FRML MDRD: >60 ML/MIN/1.73M2
GLUCOSE BLD-MCNC: 85 MG/DL (ref 70–125)
HCT VFR BLD AUTO: 46.2 % (ref 40–54)
HDLC SERPL-MCNC: 54 MG/DL
HGB BLD-MCNC: 15.2 G/DL (ref 14–18)
LDLC SERPL CALC-MCNC: 83 MG/DL
LYMPHOCYTES # BLD AUTO: 1.2 THOU/UL (ref 0.8–4.4)
LYMPHOCYTES NFR BLD AUTO: 23 % (ref 20–40)
MCH RBC QN AUTO: 31 PG (ref 27–34)
MCHC RBC AUTO-ENTMCNC: 32.9 G/DL (ref 32–36)
MCV RBC AUTO: 94 FL (ref 80–100)
MONOCYTES # BLD AUTO: 0.5 THOU/UL (ref 0–0.9)
MONOCYTES NFR BLD AUTO: 10 % (ref 2–10)
NEUTROPHILS # BLD AUTO: 3.3 THOU/UL (ref 2–7.7)
NEUTROPHILS NFR BLD AUTO: 64 % (ref 50–70)
PLATELET # BLD AUTO: 343 THOU/UL (ref 140–440)
PMV BLD AUTO: 9.5 FL (ref 8.5–12.5)
POTASSIUM BLD-SCNC: 4.9 MMOL/L (ref 3.5–5)
PROT SERPL-MCNC: 5.8 G/DL (ref 6–8)
PSA SERPL-MCNC: 0.4 NG/ML (ref 0–4.5)
RBC # BLD AUTO: 4.91 MILL/UL (ref 4.4–6.2)
SODIUM SERPL-SCNC: 139 MMOL/L (ref 136–145)
TRIGL SERPL-MCNC: 68 MG/DL
TSH SERPL DL<=0.005 MIU/L-ACNC: 2.96 UIU/ML (ref 0.3–5)
WBC: 5.2 THOU/UL (ref 4–11)

## 2019-07-10 ENCOUNTER — COMMUNICATION - HEALTHEAST (OUTPATIENT)
Dept: CARDIOLOGY | Facility: CLINIC | Age: 64
End: 2019-07-10

## 2019-07-10 DIAGNOSIS — I48.91 ATRIAL FIBRILLATION (H): ICD-10-CM

## 2019-12-20 ENCOUNTER — RECORDS - HEALTHEAST (OUTPATIENT)
Dept: LAB | Facility: CLINIC | Age: 64
End: 2019-12-20

## 2019-12-20 LAB
ALBUMIN SERPL-MCNC: 3.9 G/DL (ref 3.5–5)
ALP SERPL-CCNC: 80 U/L (ref 45–120)
ALT SERPL W P-5'-P-CCNC: 16 U/L (ref 0–45)
ANION GAP SERPL CALCULATED.3IONS-SCNC: 7 MMOL/L (ref 5–18)
AST SERPL W P-5'-P-CCNC: 24 U/L (ref 0–40)
BASOPHILS # BLD AUTO: 0.1 THOU/UL (ref 0–0.2)
BASOPHILS NFR BLD AUTO: 1 % (ref 0–2)
BILIRUB SERPL-MCNC: 2.3 MG/DL (ref 0–1)
BUN SERPL-MCNC: 21 MG/DL (ref 8–22)
CALCIUM SERPL-MCNC: 9.7 MG/DL (ref 8.5–10.5)
CHLORIDE BLD-SCNC: 104 MMOL/L (ref 98–107)
CHOLEST SERPL-MCNC: 160 MG/DL
CO2 SERPL-SCNC: 29 MMOL/L (ref 22–31)
CREAT SERPL-MCNC: 1.01 MG/DL (ref 0.7–1.3)
EOSINOPHIL # BLD AUTO: 0.2 THOU/UL (ref 0–0.4)
EOSINOPHIL NFR BLD AUTO: 3 % (ref 0–6)
ERYTHROCYTE [DISTWIDTH] IN BLOOD BY AUTOMATED COUNT: 12.2 % (ref 11–14.5)
FASTING STATUS PATIENT QL REPORTED: ABNORMAL
GFR SERPL CREATININE-BSD FRML MDRD: >60 ML/MIN/1.73M2
GLUCOSE BLD-MCNC: 73 MG/DL (ref 70–125)
HCT VFR BLD AUTO: 47.5 % (ref 40–54)
HDLC SERPL-MCNC: 46 MG/DL
HGB BLD-MCNC: 15.8 G/DL (ref 14–18)
LDLC SERPL CALC-MCNC: 71 MG/DL
LYMPHOCYTES # BLD AUTO: 1.2 THOU/UL (ref 0.8–4.4)
LYMPHOCYTES NFR BLD AUTO: 20 % (ref 20–40)
MCH RBC QN AUTO: 31.2 PG (ref 27–34)
MCHC RBC AUTO-ENTMCNC: 33.3 G/DL (ref 32–36)
MCV RBC AUTO: 94 FL (ref 80–100)
MONOCYTES # BLD AUTO: 0.5 THOU/UL (ref 0–0.9)
MONOCYTES NFR BLD AUTO: 8 % (ref 2–10)
NEUTROPHILS # BLD AUTO: 4.3 THOU/UL (ref 2–7.7)
NEUTROPHILS NFR BLD AUTO: 68 % (ref 50–70)
PLATELET # BLD AUTO: 323 THOU/UL (ref 140–440)
PMV BLD AUTO: 9.7 FL (ref 8.5–12.5)
POTASSIUM BLD-SCNC: 5.1 MMOL/L (ref 3.5–5)
PROT SERPL-MCNC: 6.2 G/DL (ref 6–8)
PSA SERPL-MCNC: 0.4 NG/ML (ref 0–4.5)
RBC # BLD AUTO: 5.07 MILL/UL (ref 4.4–6.2)
SODIUM SERPL-SCNC: 140 MMOL/L (ref 136–145)
TRIGL SERPL-MCNC: 213 MG/DL
TSH SERPL DL<=0.005 MIU/L-ACNC: 2.18 UIU/ML (ref 0.3–5)
WBC: 6.2 THOU/UL (ref 4–11)

## 2020-10-19 ENCOUNTER — COMMUNICATION - HEALTHEAST (OUTPATIENT)
Dept: CARDIOLOGY | Facility: CLINIC | Age: 65
End: 2020-10-19

## 2020-10-20 ENCOUNTER — RECORDS - HEALTHEAST (OUTPATIENT)
Dept: ADMINISTRATIVE | Facility: OTHER | Age: 65
End: 2020-10-20

## 2020-10-20 ENCOUNTER — OFFICE VISIT - HEALTHEAST (OUTPATIENT)
Dept: CARDIOLOGY | Facility: CLINIC | Age: 65
End: 2020-10-20

## 2020-10-20 ENCOUNTER — AMBULATORY - HEALTHEAST (OUTPATIENT)
Dept: CARDIOLOGY | Facility: CLINIC | Age: 65
End: 2020-10-20

## 2020-10-20 DIAGNOSIS — I48.0 PAROXYSMAL ATRIAL FIBRILLATION (H): ICD-10-CM

## 2020-10-20 DIAGNOSIS — I95.1 ORTHOSTATIC HYPOTENSION: ICD-10-CM

## 2020-10-20 ASSESSMENT — MIFFLIN-ST. JEOR: SCORE: 1713.86

## 2020-10-21 ENCOUNTER — COMMUNICATION - HEALTHEAST (OUTPATIENT)
Dept: CARDIOLOGY | Facility: CLINIC | Age: 65
End: 2020-10-21

## 2020-10-21 DIAGNOSIS — I48.0 PAROXYSMAL ATRIAL FIBRILLATION (H): ICD-10-CM

## 2020-10-21 DIAGNOSIS — I25.5 ISCHEMIC CARDIOMYOPATHY: ICD-10-CM

## 2020-10-21 LAB
ATRIAL RATE - MUSE: 105 BPM
DIASTOLIC BLOOD PRESSURE - MUSE: NORMAL
INTERPRETATION ECG - MUSE: NORMAL
P AXIS - MUSE: NORMAL
PR INTERVAL - MUSE: NORMAL
QRS DURATION - MUSE: 96 MS
QT - MUSE: 356 MS
QTC - MUSE: 440 MS
R AXIS - MUSE: 31 DEGREES
SYSTOLIC BLOOD PRESSURE - MUSE: NORMAL
T AXIS - MUSE: 52 DEGREES
VENTRICULAR RATE- MUSE: 92 BPM

## 2020-10-22 ENCOUNTER — AMBULATORY - HEALTHEAST (OUTPATIENT)
Dept: CARDIOLOGY | Facility: CLINIC | Age: 65
End: 2020-10-22

## 2020-11-02 ENCOUNTER — COMMUNICATION - HEALTHEAST (OUTPATIENT)
Dept: CARDIOLOGY | Facility: CLINIC | Age: 65
End: 2020-11-02

## 2020-11-02 DIAGNOSIS — I48.0 PAROXYSMAL ATRIAL FIBRILLATION (H): ICD-10-CM

## 2020-11-09 ENCOUNTER — COMMUNICATION - HEALTHEAST (OUTPATIENT)
Dept: CARDIOLOGY | Facility: CLINIC | Age: 65
End: 2020-11-09

## 2020-11-10 ENCOUNTER — AMBULATORY - HEALTHEAST (OUTPATIENT)
Dept: CARDIOLOGY | Facility: CLINIC | Age: 65
End: 2020-11-10

## 2020-11-10 DIAGNOSIS — I48.0 PAROXYSMAL ATRIAL FIBRILLATION (H): ICD-10-CM

## 2020-11-10 LAB
ATRIAL RATE - MUSE: 55 BPM
DIASTOLIC BLOOD PRESSURE - MUSE: NORMAL
INTERPRETATION ECG - MUSE: NORMAL
P AXIS - MUSE: 54 DEGREES
PR INTERVAL - MUSE: 190 MS
QRS DURATION - MUSE: 110 MS
QT - MUSE: 438 MS
QTC - MUSE: 419 MS
R AXIS - MUSE: 48 DEGREES
SYSTOLIC BLOOD PRESSURE - MUSE: NORMAL
T AXIS - MUSE: 69 DEGREES
VENTRICULAR RATE- MUSE: 55 BPM

## 2020-11-10 ASSESSMENT — MIFFLIN-ST. JEOR: SCORE: 1709.32

## 2020-11-11 ENCOUNTER — AMBULATORY - HEALTHEAST (OUTPATIENT)
Dept: CARDIOLOGY | Facility: CLINIC | Age: 65
End: 2020-11-11

## 2020-11-11 ENCOUNTER — COMMUNICATION - HEALTHEAST (OUTPATIENT)
Dept: CARDIOLOGY | Facility: CLINIC | Age: 65
End: 2020-11-11

## 2020-11-11 DIAGNOSIS — I48.0 PAROXYSMAL ATRIAL FIBRILLATION (H): ICD-10-CM

## 2020-11-25 ENCOUNTER — COMMUNICATION - HEALTHEAST (OUTPATIENT)
Dept: CARDIOLOGY | Facility: CLINIC | Age: 65
End: 2020-11-25

## 2020-11-30 ENCOUNTER — HOSPITAL ENCOUNTER (OUTPATIENT)
Dept: CARDIOLOGY | Facility: HOSPITAL | Age: 65
Discharge: HOME | End: 2020-11-30
Attending: INTERNAL MEDICINE

## 2020-11-30 DIAGNOSIS — I48.0 PAROXYSMAL ATRIAL FIBRILLATION (H): ICD-10-CM

## 2020-12-08 ENCOUNTER — COMMUNICATION - HEALTHEAST (OUTPATIENT)
Dept: CARDIOLOGY | Facility: CLINIC | Age: 65
End: 2020-12-08

## 2020-12-09 ENCOUNTER — COMMUNICATION - HEALTHEAST (OUTPATIENT)
Dept: CARDIOLOGY | Facility: CLINIC | Age: 65
End: 2020-12-09

## 2020-12-09 DIAGNOSIS — I48.0 PAROXYSMAL ATRIAL FIBRILLATION (H): ICD-10-CM

## 2020-12-22 ENCOUNTER — AMBULATORY - HEALTHEAST (OUTPATIENT)
Dept: CARDIOLOGY | Facility: CLINIC | Age: 65
End: 2020-12-22

## 2020-12-29 ENCOUNTER — COMMUNICATION - HEALTHEAST (OUTPATIENT)
Dept: CARDIOLOGY | Facility: CLINIC | Age: 65
End: 2020-12-29

## 2020-12-29 DIAGNOSIS — I48.0 PAROXYSMAL ATRIAL FIBRILLATION (H): ICD-10-CM

## 2021-01-08 ENCOUNTER — RECORDS - HEALTHEAST (OUTPATIENT)
Dept: LAB | Facility: CLINIC | Age: 66
End: 2021-01-08

## 2021-01-08 LAB
ALBUMIN SERPL-MCNC: 3.9 G/DL (ref 3.5–5)
ALP SERPL-CCNC: 89 U/L (ref 45–120)
ALT SERPL W P-5'-P-CCNC: 19 U/L (ref 0–45)
ANION GAP SERPL CALCULATED.3IONS-SCNC: 11 MMOL/L (ref 5–18)
AST SERPL W P-5'-P-CCNC: 26 U/L (ref 0–40)
BILIRUB SERPL-MCNC: 1.6 MG/DL (ref 0–1)
BUN SERPL-MCNC: 14 MG/DL (ref 8–22)
CALCIUM SERPL-MCNC: 9.1 MG/DL (ref 8.5–10.5)
CHLORIDE BLD-SCNC: 106 MMOL/L (ref 98–107)
CHOLEST SERPL-MCNC: 182 MG/DL
CO2 SERPL-SCNC: 24 MMOL/L (ref 22–31)
CREAT SERPL-MCNC: 0.9 MG/DL (ref 0.7–1.3)
FASTING STATUS PATIENT QL REPORTED: NORMAL
GFR SERPL CREATININE-BSD FRML MDRD: >60 ML/MIN/1.73M2
GLUCOSE BLD-MCNC: 98 MG/DL (ref 70–125)
HDLC SERPL-MCNC: 49 MG/DL
LDLC SERPL CALC-MCNC: 113 MG/DL
POTASSIUM BLD-SCNC: 4.5 MMOL/L (ref 3.5–5)
PROT SERPL-MCNC: 6.3 G/DL (ref 6–8)
PSA SERPL-MCNC: 0.4 NG/ML (ref 0–4.5)
SODIUM SERPL-SCNC: 141 MMOL/L (ref 136–145)
TRIGL SERPL-MCNC: 102 MG/DL
TSH SERPL DL<=0.005 MIU/L-ACNC: 3.23 UIU/ML (ref 0.3–5)

## 2021-01-11 ENCOUNTER — HOSPITAL ENCOUNTER (OUTPATIENT)
Dept: CARDIOLOGY | Facility: HOSPITAL | Age: 66
Discharge: HOME | End: 2021-01-11
Attending: INTERNAL MEDICINE

## 2021-01-11 DIAGNOSIS — I25.5 ISCHEMIC CARDIOMYOPATHY: ICD-10-CM

## 2021-01-11 DIAGNOSIS — I48.0 PAROXYSMAL ATRIAL FIBRILLATION (H): ICD-10-CM

## 2021-01-11 LAB
AORTIC ROOT: 3.4 CM
AORTIC VALVE MEAN VELOCITY: 69 CM/S
ASCENDING AORTA: 4.2 CM
AV DIMENSIONLESS INDEX VTI: 0.7
AV MEAN GRADIENT: 2 MMHG
AV PEAK GRADIENT: 4.2 MMHG
AV VALVE AREA: 3.4 CM2
AV VELOCITY RATIO: 0.8
BSA FOR ECHO PROCEDURE: 2.11 M2
CV BLOOD PRESSURE: ABNORMAL MMHG
CV ECHO HEIGHT: 75 IN
CV ECHO WEIGHT: 186 LBS
DOP CALC AO PEAK VEL: 102 CM/S
DOP CALC AO VTI: 21.1 CM
DOP CALC LVOT AREA: 4.91 CM2
DOP CALC LVOT DIAMETER: 2.5 CM
DOP CALC LVOT PEAK VEL: 76.9 CM/S
DOP CALC LVOT STROKE VOLUME: 72.6 CM3
DOP CALCLVOT PEAK VEL VTI: 14.8 CM
EJECTION FRACTION: 55 % (ref 55–75)
FRACTIONAL SHORTENING: 31.8 % (ref 28–44)
INTERVENTRICULAR SEPTUM IN END DIASTOLE: 1.17 CM (ref 0.6–1)
IVS/PW RATIO: 1.1
LA AREA 1: 17.1 CM2
LA AREA 2: 15.8 CM2
LEFT ATRIUM LENGTH: 4.8 CM
LEFT ATRIUM VOLUME INDEX: 22.7 ML/M2
LEFT ATRIUM VOLUME: 47.8 ML
LEFT VENTRICLE CARDIAC INDEX: 2.2 L/MIN/M2
LEFT VENTRICLE CARDIAC OUTPUT: 4.6 L/MIN
LEFT VENTRICLE DIASTOLIC VOLUME INDEX: 38.1 CM3/M2 (ref 34–74)
LEFT VENTRICLE DIASTOLIC VOLUME: 80.4 CM3 (ref 62–150)
LEFT VENTRICLE HEART RATE: 64 BPM
LEFT VENTRICLE MASS INDEX: 82.3 G/M2
LEFT VENTRICLE SYSTOLIC VOLUME INDEX: 17.1 CM3/M2 (ref 11–31)
LEFT VENTRICLE SYSTOLIC VOLUME: 36.1 CM3 (ref 21–61)
LEFT VENTRICULAR INTERNAL DIMENSION IN DIASTOLE: 4.44 CM (ref 4.2–5.8)
LEFT VENTRICULAR INTERNAL DIMENSION IN SYSTOLE: 3.03 CM (ref 2.5–4)
LEFT VENTRICULAR MASS: 173.6 G
LEFT VENTRICULAR OUTFLOW TRACT MEAN GRADIENT: 1 MMHG
LEFT VENTRICULAR OUTFLOW TRACT MEAN VELOCITY: 48.8 CM/S
LEFT VENTRICULAR OUTFLOW TRACT PEAK GRADIENT: 2 MMHG
LEFT VENTRICULAR POSTERIOR WALL IN END DIASTOLE: 1.05 CM (ref 0.6–1)
LV STROKE VOLUME INDEX: 34.4 ML/M2
MITRAL VALVE E/A RATIO: 1.5
MV AVERAGE E/E' RATIO: 6.8 CM/S
MV DECELERATION TIME: 268 MS
MV E'TISSUE VEL-LAT: 11 CM/S
MV E'TISSUE VEL-MED: 9.57 CM/S
MV LATERAL E/E' RATIO: 6.3
MV MEDIAL E/E' RATIO: 7.3
MV PEAK A VELOCITY: 47.5 CM/S
MV PEAK E VELOCITY: 69.8 CM/S
NUC REST DIASTOLIC VOLUME INDEX: 2976 LBS
NUC REST SYSTOLIC VOLUME INDEX: 75 IN
TRICUSPID REGURGITATION PEAK PRESSURE GRADIENT: 17.8 MMHG
TRICUSPID VALVE ANULAR PLANE SYSTOLIC EXCURSION: 2.5 CM
TRICUSPID VALVE PEAK REGURGITANT VELOCITY: 211 CM/S

## 2021-01-11 ASSESSMENT — MIFFLIN-ST. JEOR: SCORE: 1709.32

## 2021-01-13 ENCOUNTER — COMMUNICATION - HEALTHEAST (OUTPATIENT)
Dept: CARDIOLOGY | Facility: CLINIC | Age: 66
End: 2021-01-13

## 2021-01-13 DIAGNOSIS — I48.0 PAROXYSMAL ATRIAL FIBRILLATION (H): ICD-10-CM

## 2021-01-15 ENCOUNTER — AMBULATORY - HEALTHEAST (OUTPATIENT)
Dept: CARDIOLOGY | Facility: CLINIC | Age: 66
End: 2021-01-15

## 2021-01-15 ENCOUNTER — RECORDS - HEALTHEAST (OUTPATIENT)
Dept: ADMINISTRATIVE | Facility: OTHER | Age: 66
End: 2021-01-15

## 2021-02-03 ENCOUNTER — COMMUNICATION - HEALTHEAST (OUTPATIENT)
Dept: CARDIOLOGY | Facility: CLINIC | Age: 66
End: 2021-02-03

## 2021-05-30 NOTE — TELEPHONE ENCOUNTER
Called pt to discuss concerns. Pt states he has been feeling lightheaded the past couple weeks. He went to the drug store to get his BP checked and it was 84/64. He is currently taking 30 mg of Sildenafil about once a week. Pt attempted to make FU appointment with [facility] however he would like afternoon appointment so his wife can accompany him. Pt states he stays well hydrated drinking almost a gallon of water a day.           Dr. Hicks, pt complains of lightheadedness related to low BP. His BP at the drug store was 84/64. Last seen 8/2/18, pt attempted to schedule follow-up however he wasn't able to find a suitable date/time.

## 2021-05-30 NOTE — TELEPHONE ENCOUNTER
Pt returned call. Reviewed [facility] recommendations. Pt agreeable to plan. Echo order placed. Pt will call scheduling to arrange. -kcl

## 2021-05-30 NOTE — TELEPHONE ENCOUNTER
From: Carley Hicks MD  Sent: 7/10/2019   2:59 PM  To: Tali Zambrano, DANETTE  With suggest appointment with primary since he has no significant heart issues and probably easier to get into see them.  In interim we should at least check echocardiogram.  I defer to primary whether they would like to start him on some Midrin.  LF      Called and LM for pt to return call to discuss- kcl

## 2021-05-30 NOTE — TELEPHONE ENCOUNTER
----- Message from Karen Sravan sent at 7/10/2019 10:02 AM CDT -----  Contact: Pt  General phone call:    Caller: Pt  Primary cardiologist: Kurt  Detailed reason for call: Pt states he was lightheaded earlier and took his BP and it was very low. Wants to know how to proceed. Please advise.  Best phone number: [phone]  Best time to contact: Any  Ok to leave a detailed message? Yes  Device? No    Additional Info:

## 2021-05-31 VITALS — HEIGHT: 75 IN | BODY MASS INDEX: 22.38 KG/M2 | WEIGHT: 180 LBS

## 2021-05-31 VITALS — WEIGHT: 180 LBS | HEIGHT: 75 IN | BODY MASS INDEX: 22.38 KG/M2

## 2021-05-31 VITALS — BODY MASS INDEX: 22.26 KG/M2 | HEIGHT: 75 IN | WEIGHT: 179 LBS

## 2021-06-01 VITALS — HEIGHT: 75 IN | BODY MASS INDEX: 22.38 KG/M2 | WEIGHT: 180 LBS

## 2021-06-05 VITALS
HEIGHT: 75 IN | DIASTOLIC BLOOD PRESSURE: 78 MMHG | HEART RATE: 58 BPM | WEIGHT: 186 LBS | RESPIRATION RATE: 14 BRPM | SYSTOLIC BLOOD PRESSURE: 110 MMHG | BODY MASS INDEX: 23.13 KG/M2

## 2021-06-05 VITALS
BODY MASS INDEX: 23.25 KG/M2 | SYSTOLIC BLOOD PRESSURE: 96 MMHG | HEART RATE: 88 BPM | RESPIRATION RATE: 16 BRPM | DIASTOLIC BLOOD PRESSURE: 50 MMHG | HEIGHT: 75 IN | WEIGHT: 187 LBS

## 2021-06-05 VITALS — WEIGHT: 186 LBS | BODY MASS INDEX: 23.13 KG/M2 | HEIGHT: 75 IN

## 2021-06-10 NOTE — PROGRESS NOTES
[facility] Follow-up Note    Assessment & Plan        1. Typical atrial flutter -he had this in past and was seen by electrophysiology, he was offered ablation but he declined this.  I do not see any recurrence of this.     2.  Atrial fibrillation- appears to be not valvular and symptomatic in that he has lightheadedness as well as palpitations.  I think would be beneficial if we try to get him back in the rhythm.  I will start him on Eliquis 5 mg of mouth twice a day, check echo and if echo shows no structural issues and I know he had a normal stress echo placed on flecainide 50 mg twice a day, and arrange for outpatient cardioversion.  Hopefully this will help his blood pressure.     3. Orthostatic hypotension -I have asked him to stay well-hydrated which she does, I suspect however this is most likely due to atrial fibrillation and thus the reason to get back into rhythm.     4.  Erectile dysfunction-uncertain why the Viagra did not work, unless was due to low blood pressure in the atrial fibrillation.  He has had hypospadias repaired and I am wondering if there is some structural defect there and suggested follow-up with his urologist.       Plan  1.  Start Eliquis 5 mg twice a day.  2.  Needs echocardiogram.  3.  No structural heart disease flecainide 50 mg by mouth twice a day.   4.  Will need to arrange for outpatient cardioversion.  5.  Follow-up with me about 3 months or sooner if needed.    Subjective  CC: 61-year-old white gentleman here for six-month follow-up today.  Since I seen him he has had no sustained episodes of palpitations.  Does feel an occasional skipped come and go.  These could be may be of the day or so.  He does have some generalized orthostatic lightheadedness if he gets up quickly.  There is no PND, orthopnea, chest discomfort, or peripheral edema.    Medications  Current Outpatient Prescriptions   Medication Sig Note     aspirin 325 MG tablet Take 162.5 mg by mouth  "daily.       PROTEIN SUPPLEMENT (PROTEIN ORAL) Take by mouth daily.      valACYclovir (VALTREX) 500 MG tablet  5/20/2016: Received from: [facility]     VIAGRA 100 mg tablet TK 1 T PO ONE HOUR PRIOR TO INTERCOURSE. 5/20/2016: Received from: External Pharmacy     ZOLMitriptan (ZOMIG) 5 MG tablet Take 5 mg by mouth as needed for migraine.      amoxicillin (AMOXIL) 500 MG tablet Take 2,000 mg by mouth. 5/20/2016: Received from: [facility]       Objective  BP 90/62 (Patient Site: Left Arm, Patient Position: Sitting, Cuff Size: Adult Regular)  Pulse 76  Resp 16  Ht 6' 3\" (1.905 m)  Wt 180 lb (81.6 kg)  BMI 22.5 kg/m2    General Appearance:    Alert, cooperative, no distress, appears stated age   Head:    Normocephalic, without obvious abnormality, atraumatic   Throat:   Lips, mucosa, and tongue normal; teeth and gums normal   Neck:   Supple, symmetrical, trachea midline, no adenopathy;        thyroid:  No enlargement/tenderness/nodules; no carotid    bruit or JVD   Back:     Symmetric, no curvature, ROM normal, no CVA tenderness   Lungs:     Clear to auscultation bilaterally, respirations unlabored   Chest wall:    No tenderness or deformity   Heart:   irregularly irregular, S1 and S2 normal, no murmur, rub   or gallop   Abdomen:     Soft, non-tender, bowel sounds active all four quadrants,     no masses, no organomegaly   Extremities:   Normal, atraumatic, no cyanosis or edema   Pulses:   2+ and symmetric all extremities   Skin:   Skin color, texture, turgor normal, no rashes or lesions     Results    Lab Results personally reviewed   Lab Results   Component Value Date    CHOL 183 12/12/2016     Lab Results   Component Value Date    HDL 52 12/12/2016     Lab Results   Component Value Date    LDLCALC 108 12/12/2016     Lab Results   Component Value Date    TRIG 115 12/12/2016     No results found for: WBC, HGB, HCT, PLT  Lab Results   Component Value Date    CREATININE 0.97 " 12/12/2016    BUN 15 12/12/2016     12/12/2016    K 4.9 12/12/2016    CO2 28 12/12/2016     Reviewed electrocardiogram fibrillation, low voltage, slow ventricular response, no acute changes.    Review of Systems:   General: WNL  Eyes: WNL  Ears/Nose/Throat: WNL  Lungs: WNL  Heart: WNL  Stomach: WNL  Bladder: WNL  Muscle/Joints: WNL  Skin: WNL  Nervous System: WNL  Mental Health: WNL     Blood: WNL

## 2021-06-11 NOTE — PROGRESS NOTES
[facility] Follow-up Note    Assessment & Plan        1. Paroxysmal atrial fibrillation - appears to be not valvular and symptomatic in that he has lightheadedness as well as palpitations.  Apparently he goes in and out of this without his knowledge and his CHADS 2 VASC score is 0 and see me as he is in sinus rhythm now I will not start him on Eliquis 5 mg of mouth twice a day.  If he has recurrent symptomatic spells would consider beta-blocker or calcium channel blocker, since it is paroxysmal I would not perform cardioversion, if he has frequent spells would switch over to something like flecainide.  TSH was normal and echo showed no structural abnormality.  Might consider ruling out coronary artery disease in future although he had a normal stress echo.   2. Primary osteoarthritis of left knee -prior left knee replacement and no longer needing antibiotics.     3.  Sleep apnea-mild obstructive case, suggest discussed with dentist mouthguard or bite block, use nasal strips since this is the only correctable prevention of atrial fibrillation IC.  4.  Mild cardiomyopathy- ejection fraction 50% and orthostatic hypotension but if able would like to add low-dose beta-blocker.  5.  Low blood pressure-no medicines just yet.    Plan  1.  Continue current medications.  2.  Follow-up with me in about 6 months or sooner if needed.  3.  Treat sleep apnea.  4.  Symptomatic spells occur consider adding beta-blocker or calcium channel blocker before trying antiarrhythmic.    Subjective  CC: 61-year-old white gentleman here for follow-up today.  He tells me at times he feels jittery but otherwise denies any chest discomfort, palpitations, shortness of breath, PND, orthopnea or peripheral edema.    Medications  Current Outpatient Prescriptions   Medication Sig Note     apixaban (ELIQUIS) 5 mg Tab tablet Take 1 tablet (5 mg total) by mouth 2 (two) times a day.      aspirin 325 MG tablet Take 162.5 mg by mouth  "daily.       PROTEIN SUPPLEMENT (PROTEIN ORAL) Take by mouth daily.      valACYclovir (VALTREX) 500 MG tablet as needed.  5/20/2016: Received from: [facility]     VIAGRA 100 mg tablet TK 1 T PO ONE HOUR PRIOR TO INTERCOURSE. 5/20/2016: Received from: External Pharmacy     ZOLMitriptan (ZOMIG) 5 MG tablet Take 5 mg by mouth as needed for migraine.        Objective  /58 (Patient Site: Right Arm, Patient Position: Sitting, Cuff Size: Adult Regular)  Pulse 60  Resp 16  Ht 6' 3\" (1.905 m)  Wt 179 lb (81.2 kg)  BMI 22.37 kg/m2    General Appearance:    Alert, cooperative, no distress, appears stated age   Head:    Normocephalic, without obvious abnormality, atraumatic   Throat:   Lips, mucosa, and tongue normal; teeth and gums normal   Neck:   Supple, symmetrical, trachea midline, no adenopathy;        thyroid:  No enlargement/tenderness/nodules; no carotid    bruit or JVD   Back:     Symmetric, no curvature, ROM normal, no CVA tenderness   Lungs:     Clear to auscultation bilaterally, respirations unlabored   Chest wall:    No tenderness or deformity   Heart:    Regular rate and rhythm, S1 and S2 normal, no murmur, rub   or gallop   Abdomen:     Soft, non-tender, bowel sounds active all four quadrants,     no masses, no organomegaly   Extremities:   Normal, atraumatic, no cyanosis or edema   Pulses:   2+ and symmetric all extremities   Skin:   Skin color, texture, turgor normal, no rashes or lesions     Results    Lab Results personally reviewed   Lab Results   Component Value Date    CHOL 183 12/12/2016     Lab Results   Component Value Date    HDL 52 12/12/2016     Lab Results   Component Value Date    LDLCALC 108 12/12/2016     Lab Results   Component Value Date    TRIG 115 12/12/2016     No results found for: WBC, HGB, HCT, PLT  Lab Results   Component Value Date    CREATININE 0.97 12/12/2016    BUN 15 12/12/2016     12/12/2016    K 4.9 12/12/2016    CO2 28 " 12/12/2016     Reviewed electrocardiogram sinus rhythm with occasional PAC and no acute changes.    Review of Systems:   General: WNL  Eyes: WNL  Ears/Nose/Throat: WNL  Lungs: Snoring  Heart: Irregular Heartbeat  Stomach: WNL  Bladder: WNL  Muscle/Joints: WNL  Skin: WNL  Nervous System: WNL  Mental Health: WNL     Blood: WNL

## 2021-06-12 NOTE — TELEPHONE ENCOUNTER
Wellness Screening Tool  Symptom Screening:  Do you have one of the following NEW symptoms:    Fever (subjective or >100.0)?  No    A new cough?  No    Shortness of breath?  No     Chills? No     New loss of taste or smell? No     Generalized body aches? No     New persistent headache? No     New sore throat? No     Nausea, vomiting, or diarrhea?  No    Within the past 2 weeks, have you been exposed to someone with a known positive illness below:    COVID-19 (known or suspected)?  No    Chicken pox?  No    Mealses?  No    Pertussis?  No    Patient notified of visitor policy- They may have one person accompany them to their appointment, but they will need to wear a mask and will be screened upon arrival for symptoms: Yes  Pt informed to wear a mask: Yes  Pt notified if they develop any symptoms listed above, prior to their appointment, they are to call the clinic directly at [phone] for further instructions.  Yes  Patient's appointment status: Patient will be seen in clinic as scheduled on 10/20/20

## 2021-06-12 NOTE — TELEPHONE ENCOUNTER
----- Message from Sarah Bunn sent at 11/2/2020  8:55 AM CST -----  General phone call:    Caller: Patient  Primary cardiologist: Kurt  Detailed reason for call: Patient called in to schedule an EKG since he just started Flecainide 11/1/320.  I will need a new order please    New or active symptoms?   Best phone number: [phone]  Best time to contact: Anytime   Ok to leave a detailed message? Yes  Device? no    Additional Info:        Dr. Hicks, pt calling to schedule an EKG after starting flecainide 11/1. I don't see any mention of wanting us to repeat this following 10/20 OV. He is scheduled for a cardioversion 11/18. Please clarify if you would like him to come in for a EKG and when? Thank you!

## 2021-06-12 NOTE — TELEPHONE ENCOUNTER
Received return call from Jesus. Confirmed with him that Dr. Hicks would like an EKG following the start of Flecainide. Order previously placed. Informed pt that our scheduling team should be in touch with him to arrange this. He was given the schedulers number to call in case he doesn't here back. Pt denies any issues or side effects with flecainide so far. -kcl

## 2021-06-12 NOTE — PROGRESS NOTES
CV order from Dr Hicks, no device but new Eliquis start can go after 11/12, and pt is to start flecainide in 1 week   If does not convert will then set CV date and do teach  No COVID orders placed  Pt is aware and has my direct number for contact

## 2021-06-12 NOTE — TELEPHONE ENCOUNTER
----- Message from Carley Hicks MD sent at 10/20/2020  5:13 PM CDT -----  Can we set up with cardioversion in about 3 weeks or so, preferably with me if available.  Starting Eliquis tonight, will be adding flecainide in a week.  Will also need post shock cardioversion echo in about 4 weeks please.LF        Noted. Will forward to Azeb. Orders placed for cardioversion and then for echocardiogram post-cardioversion. -[facility] [place]        Azeb,  See above- order for cardioversion placed. Echo order placed which will be in week following cardioversion. Just in case the patient asks.  Thanks,  Mal

## 2021-06-12 NOTE — PATIENT INSTRUCTIONS - HE
Mr Jesus Hendricks,  I enjoyed visiting with you again today.  I am glad to hear you are doing well.  Per our conversation start the ELIQUIS two times a day and let me know if issues at [phone].  In about 1 week start the FLECANIDE two times a day. I will plan on cardioversion in about 3-4 weeks.  I will plan on seeing you thereafter.  Navdeep Hicks

## 2021-06-12 NOTE — TELEPHONE ENCOUNTER
Wellness Screening Tool  Symptom Screening:  Do you have one of the following NEW symptoms:    Fever (subjective or >100.0)?  No    A new cough?  No    Shortness of breath?  No     Chills? No     New loss of taste or smell? No     Generalized body aches? No     New persistent headache? No     New sore throat? No     Nausea, vomiting, or diarrhea?  No    Within the past 2 weeks, have you been exposed to someone with a known positive illness below:    COVID-19 (known or suspected)?  No    Chicken pox?  No    Mealses?  No    Pertussis?  No    Patient notified of visitor policy- They may have one person accompany them to their appointment, but they will need to wear a mask and will be screened upon arrival for symptoms: Yes  Pt informed to wear a mask: Yes  Pt notified if they develop any symptoms listed above, prior to their appointment, they are to call the clinic directly at [phone] for further instructions.  Yes  Patient's appointment status: Patient will be seen in clinic as scheduled on 11/10

## 2021-06-13 NOTE — TELEPHONE ENCOUNTER
Received a call from patient- he needs a copy of script for Flecainide for his insurance. He requested it to be emailed. We are unable to email to personal email addresses but can fax. LM with patient to see if he had a fax that writer could send it to. -[facility] [place]        Received a call back from Jesus- Faxed to him at [phone] with success. -[facility] [place]

## 2021-06-13 NOTE — TELEPHONE ENCOUNTER
Called patient and LM that LBF would like him to have 21 day event monitor prior to December appointment. Msg sent to schedulers to arrange. Advised Jesus to call back if he has any questions. -[facility] [place]

## 2021-06-13 NOTE — TELEPHONE ENCOUNTER
==View-only below this line===  ----- Message -----  From: Carley Hicks MD  Sent: 11/25/2020  10:35 AM CST  To: Delmy Rivera RN    Please explained to him that we do cardioversions only when in atrial fibrillation, once he converts to sinus rhythm no need to do a cardioversion.  If he feels himself going in and out of atrial fibrillation, I would like to obtain a 21-day ACT monitor such that would document A. fib and need to go up on dose of flecainide or possibly another medication.  Prior to that, is he being compliant with flecainide?  If not maybe he should be compliant with medicine first before we do any other work-up.  LF          Called patient and left message for patient to call back to discuss recommendations as above from LBF. See note from 11/11/2020- previous recommendation for 21 day event monitor at that time. Pt previously confirmed compliance with flecainide. -[facility] [place]

## 2021-06-13 NOTE — TELEPHONE ENCOUNTER
===View-only below this line===  ----- Message -----  From: Carley Hicks MD  Sent: 11/11/2020   1:29 PM CST  To: Delmy Rivera RN    Yes we can cancel cardioversion, this put a postponement on the echo, I would like to check a 21-day ACT monitor to see if he is going in and out of A. fib unbeknownst to him or maybe his strange symptoms.  Once his monitor comes back showing for the most part sinus rhythm we can then recheck echo.  Should be able to get this all done the next month before he sees me.  LF      21 day event monitor ordered. Msg sent to schedulers to cancel appt with Afib clinic for post cardioversion. Will do monitor prior to echo. -[facility] [place]

## 2021-06-13 NOTE — TELEPHONE ENCOUNTER
----- Message from Carley Hicks MD sent at 11/11/2020  9:00 AM CST -----  Thank you, you can you inform patient that cardioversion can be canceled, hopefully he is tolerating the flecainide fairly well, please confirm dose and confirm also he is on no beta-blocker. Continue anticoagulation for the time being and he and I can discuss when he comes in to see me, set up follow-up. LF  ----- Message -----  From: Delmy Rivera RN  Sent: 11/11/2020   8:45 AM CST  To: Carley Hicks MD    Hi,  This was done yesterday in my absence. EKG after flecainide start and pt has an upcoming/pending cardioversion.  Thanks,  Mal

## 2021-06-13 NOTE — TELEPHONE ENCOUNTER
"Called patient and LM to call back to discuss EKG and plan moving forward. -[facility] [place]        Addendum:   Received a call back from Jesus. He verbalized understanding that we do not need the cardioversion now. He does not feel if he is in a rate-controlled Afib, only if it is \"out of whack\" or in a rapid rate. He will call if he experiences that. He confirms he is on Flecainide 50 mg twice a day. He is not a beta blocker. He will continue anticoagulation and follow up with LBF. Transferred to scheduling to have arranged. -[facility] [place]      Dr. Hicks,  We had originally ordered an echocardiogram to be done 4 weeks post-cardioversion. Do you have a new timeline for this to be completed? Also, may we cancel appt with Afib clinic 12/9 since he is not having cardioversion and will see you 12/16?  Thanks,  Fred Bowie,  We can hold off on cardioversion for now.  Thanks for all you help!  Fred"

## 2021-06-13 NOTE — TELEPHONE ENCOUNTER
Received a call back from patient; updated on the need for event monitor. He verbalized understanding and was transferred to scheduling to have arranged. -[facility] [place]

## 2021-06-13 NOTE — TELEPHONE ENCOUNTER
----- Message from CHE Lindsay sent at 11/25/2020  8:20 AM CST -----  Regarding: LBF PATIENT  General phone call:    Caller: Patient    Primary cardiologist: ALVIN    Detailed reason for call: patient wants to discuss possible cardioversion    Best phone number: [phone]    Best time to contact: any    Ok to leave a detailed message? Yes    Device? no    Additional Info:         for patient to call back to discuss. -[facility] [place]      Received a call back from Jesus. His cardioversion was put off due to being back in NSR after EKG and starting Flecainide therapy. He calls in because he had a really bad weekend. He felt his heart beating erratically and had a lot of fatigue and feelings of lightheadedness. He thinks he is back in rhythm today and feeling alright. He just wonders if he should still be on the board for cardioversion given that he felt he in Afib over the weekend. Again, today, he is asymptomatic and feels better. His pulse feels regular. Will update LBF. -[facility] [place]      Jesus Lyles felt himself go into what he thinks was Afib over the weekend- endorsed erratic pulse, fatigue and lightheadedness. He feels better today. He wants to know if he should get back on the books for a cardioversion.  Thanks,  Fred

## 2021-06-13 NOTE — TELEPHONE ENCOUNTER
----- Message from Kayla Jalloh sent at 12/8/2020 12:07 PM CST -----  General phone call:    Caller: [facility]  Primary cardiologist: Kurt  Detailed reason for call: Holding and bridging/Eloquis for 12/29 Colonoscopy- requesting 3 day hold  New or active symptoms?   Best phone number:   Best time to contact: [phone]  Ok to leave a detailed message?   Device?     Additional Info:       Dr. Hicks, ok to hold eliquis for 3 days prior to colonoscopy scheduled on 12/29?

## 2021-06-14 NOTE — PROGRESS NOTES
Multiday monitor showed many episodes atrial fibrillation  Ventricular rate is moderately fast  He is on Eliquis

## 2021-06-14 NOTE — TELEPHONE ENCOUNTER
Received a voicemail from Jesus. He had his echo on 1/11/2021. He has an appt with LBF on 1/22/2021 and then his EP MD on 2/18/2021 with [facility]. He wonders if he should delay his appt with LBF until after his appt with them. He is moving forward with the ablation so he is not sure he needs another discussion. Will pass along to [facility]. -[facility] [place]        Dr. Hicks,  Pt is wondering if he should delay his follow up with you on the 22nd as he is going to meet with EP for ablation with [facility]. Thoughts?  Thanks,  Mal

## 2021-06-14 NOTE — TELEPHONE ENCOUNTER
===View-only below this line===  ----- Message -----  From: Carley Hicks MD  Sent: 1/13/2021   9:45 AM CST  To: Delmy Rivera RN    Agree postponing appointment with me, no reason to come in and see me.  LF        Tried calling patient again; LM for him to call back to relay message from [facility]. [place] [place]        Addendum:  Received a call back from Jesus. Appt with [facility] cancelled until EP evaluation is completed. Refill of Eliquis Rx sent to [facility] on file per his request. [place] [place]

## 2021-06-14 NOTE — TELEPHONE ENCOUNTER
Received a call back from Jesus. He states that he is seeing an EP MD with [facility] after he sees LBF end of January. He does not feel like he needs a refill of his flecainide just yet but will call if he does. He will see Dr. Navi Dejesus. Will update medical records for [facility]. -mmjc        Juancho day,  This patient will be seeing Dr. Navi Dejesus with [facility] and will need records released there for a consult for ablation.  Thanks,  Mal

## 2021-06-14 NOTE — TELEPHONE ENCOUNTER
----- Message from Carley Hicks MD sent at 12/23/2020 10:32 AM CST -----  I called patient and discussed that his heart monitor did show his daily atrial fibrillation, he admits he was symptomatic from these.  He is on Eliquis twice daily.  I will increase flecainide to 100 mg p.o. twice daily, I told him that he gets lightheaded and has other side effects from to go down to 75 mg p.o. twice daily.  Lastly, can you arrange for him to see EP, ablation of atrial fibrillation, I did briefly warn him that the could also be atrial flutter as well.  Somewhat complicated.  He did mention he might want to see somebody at [facility] for ablation that his wife knows.           Delmy Rivera, RN   12/29/2020 10:11 AM CST      Received a voicemail back from Jesus. Tried calling him back and left a voicemail stating that it was OK for him to pursue EP consult at [facility] prior to appt with LBF- no issues there. Instructed him to please call back if he needs a refill of Flecainide. -[facility] [place]    Delmy Rivera, RN   12/28/2020 12:07 PM CST      LM with Jesus to see if he would need a refill for increased dose of Flecainide along with seeing if he would want appt with our EP MD's facilitated or if he was planning on going through [facility] [place]. Call back number provided. -[facility] [place]           Medication list updated; no message sent to schedulers just yet as patient wishes to pursue EP MD at [facility]. -[facility] [place]

## 2021-06-14 NOTE — TELEPHONE ENCOUNTER
----- Message from Miranda Mehta sent at 2/3/2021 12:14 PM CST -----  Regarding: RE: records sent to [facility]  He is going to have to fill out an HAI and the main Release of Info will have to take care of this one.  There fax is [phone].  Or [facility] can request records by faxing a request to the same main HAI fax number.  Miranda  ----- Message -----  From: Delmy Rivera RN  Sent: 2/3/2021  11:58 AM CST  To: [facility] Medical Records Pool  Subject: records sent to [facility]      Hi,  This patient left a message for me stating that he needs his records sent to Dr. Dejesus with the [facility]. Would he need to call the main HAI or would [facility] be able to obtain these themselves?  Thanks,  Mal               Noted message. Called Jesus and left a detailed message stating that he will need to go through main release of information and sign consent for records to be released. Gave their number and also stated that [facility] should be getting any records they need- he should not need to do any of this. Records can all be seen through care everywhere at any rate, if this consent is signed by patient. -[facility]

## 2021-06-14 NOTE — PROGRESS NOTES
Abnormal multiday monitor  Multiple days of atrial fibrillation may been atrial fibrillation since December 9  Ventricular rate is pretty well controlled  Listed as taking Eliquis 5 mg twice daily

## 2021-06-17 NOTE — TELEPHONE ENCOUNTER
Telephone Encounter by Tali Zambrano RN at 12/8/2020  1:04 PM     Author: Tali Zambrano RN Service: -- Author Type: Registered Nurse    Filed: 12/8/2020  1:07 PM Encounter Date: 12/8/2020 Status: Signed    : Tali Zambrano, DANETTE (Registered Nurse)       Carley Hicks MD Larsen, Kellie C, RN   Caller: Unspecified (Today, 12:07 PM)             Yes, we can hold Eliquis for 3 days, hopefully he is not feeling any atrial fibrillation.   LF      Called and left detailed VM for [facility] nurse outlining hold orders above. -kcl

## 2021-06-17 NOTE — TELEPHONE ENCOUNTER
Telephone Encounter by Tali Zambrano RN at 11/2/2020 12:31 PM     Author: Tali Zambrano RN Service: -- Author Type: Registered Nurse    Filed: 11/2/2020 12:33 PM Encounter Date: 11/2/2020 Status: Signed    : Tali Zambrano RN (Registered Nurse)       Carley Hicks MD Larsen, Kellie C, RN   Caller: Unspecified (Today,  8:55 AM)             Yes, I would like to check ECG, he was wondering whether he would convert spontaneously on flecainide.  LF      ECG ordered, msg sent to scheduling to arrange early this week preferably at [facility] location. -kcl

## 2021-06-19 NOTE — PROGRESS NOTES
[facility] Follow-up Note    Assessment & Plan        1. Paroxysmal atrial fibrillation (H) - appears to be not valvular and symptomatic in that he has lightheadedness as well as palpitations.  Apparently he goes in and out of this without his knowledge and his CHADS 2 VASC score is 0 and see as he is in sinus rhythm now I will not start him on Eliquis 5 mg of mouth twice a day.  If he has recurrent symptomatic spells would consider beta-blocker or calcium channel blocker, since it is paroxysmal I would not perform cardioversion, if he has frequent spells would switch over to something like flecainide pill in the pocket approach.  TSH was normal and echo showed no structural abnormality.  Ruled out coronary artery disease,he had a normal stress echo.  We will have him wear a 21 day event monitor and if frequent atrial arrhythmias seen will need to discuss length of the episodes and whether to start anticoagulation just yet.   2. Orthostatic hypotension -given this very hesitant to place on beta-blocker just yet, favor pill in the pocket approach.   3. Post-procedural erectile dysfunction, unspecified type -on revatio as needed.     Plan  1.  21 day ACT monitor.  Have him get blood during this to try to re-create bad episode.  2.  If frequent arrhythmias seen consider pill in the pocket approach.  3.  Follow-up with me in 1 year or sooner if needed.  4.  When he reaches age 64 start Eliquis.    Subjective  CC: 59-year-old white gentleman being seen in yearly follow-up today.  He states maybe once a week for half an hour or less he will have palpitations in his chest.  He tells me he had one bad episode after he gave blood the following day that lasted for about 3-4 hours.  He tells me he did have [place] strenuous [place] in [facility] and he was extremely fatigued with that, although this was at about [place] [place] elevation and just after arrival.  He works out religiously and has no  "major issues.    Medications  Current Outpatient Prescriptions   Medication Sig Note     PROTEIN SUPPLEMENT (PROTEIN ORAL) Take by mouth daily.      sildenafil, antihypertensive, (REVATIO) 20 mg tablet sildenafil (antihypertensive) 20 mg tablet 8/2/2018: Received from: [facility]     valACYclovir (VALTREX) 500 MG tablet as needed.  5/20/2016: Received from: [facility] [employer] [facility]     ZOLMitriptan (ZOMIG) 5 MG tablet Take 5 mg by mouth as needed for migraine.      zolpidem (AMBIEN) 10 mg tablet zolpidem 10 mg tablet 8/2/2018: Received from: [facility]       Objective  BP 98/52 (Patient Site: Left Arm, Patient Position: Sitting, Cuff Size: Adult Regular)  Pulse 60  Resp 16  Ht 6' 3\" (1.905 m)  Wt 180 lb (81.6 kg)  BMI 22.5 kg/m2    General Appearance:    Alert, cooperative, no distress, appears stated age   Head:    Normocephalic, without obvious abnormality, atraumatic   Throat:   Lips, mucosa, and tongue normal; teeth and gums normal   Neck:   Supple, symmetrical, trachea midline, no adenopathy;        thyroid:  No enlargement/tenderness/nodules; no carotid    bruit or JVD   Back:     Symmetric, no curvature, ROM normal, no CVA tenderness   Lungs:     Clear to auscultation bilaterally, respirations unlabored   Chest wall:    No tenderness or deformity   Heart:    Regular rate and rhythm, S1 and S2 normal, no murmur, rub   or gallop   Abdomen:     Soft, non-tender, bowel sounds active all four quadrants,     no masses, no organomegaly   Extremities:   Normal, atraumatic, no cyanosis or edema   Pulses:   2+ and symmetric all extremities   Skin:   Skin color, texture, turgor normal, no rashes or lesions     Results    Lab Results personally reviewed   Lab Results   Component Value Date    CHOL 183 12/12/2016     Lab Results   Component Value Date    HDL 52 12/12/2016     Lab Results   Component Value Date    LDLCALC 108 12/12/2016 "     Lab Results   Component Value Date    TRIG 115 12/12/2016     Lab Results   Component Value Date    WBC 6.8 02/01/2018    HGB 14.7 02/01/2018    HCT 44.7 02/01/2018     02/01/2018     Lab Results   Component Value Date    CREATININE 0.97 12/12/2016    BUN 15 12/12/2016     12/12/2016    K 4.9 12/12/2016    CO2 28 12/12/2016     Review of Systems:   General: WNL  Eyes: WNL  Ears/Nose/Throat: WNL  Lungs: Snoring  Heart: Shortness of Breath with activity, Irregular Heartbeat  Stomach: WNL  Bladder: WNL  Muscle/Joints: WNL  Skin: WNL  Nervous System: WNL  Mental Health: WNL     Blood: WNL

## 2021-06-19 NOTE — PROGRESS NOTES
[facility] called about first detection of AF; VR about 80-patient not answering phone  History of PAF  Plan  Will inform Dr Hicks

## 2021-06-29 NOTE — PROGRESS NOTES
Progress Notes by Carley Hicks MD at 10/20/2020  4:10 PM     Author: Carley Hicks MD Service: -- Author Type: Physician    Filed: 10/20/2020  5:13 PM Encounter Date: 10/20/2020 Status: Signed    : Carley Hicks MD (Physician)           [facility]  Heart Care Clinic Follow-up Note    Assessment & Plan        1. Paroxysmal atrial fibrillation (H) -symptomatic, not valvular based on remote echo, paroxysmal/persistent.  Given recurrences in his [id number]-[id number] score of 1 we will start him on Eliquis 5 mg p.o. twice daily.  We will also start him on flecainide 50 mg p.o. twice daily.  Plan on cardioversion in about 3 weeks.   2. Orthostatic hypotension -suspect secondary to atrial fibrillation and thus the reason to get him back in the normal heartbeat.   3.  Cardiomyopathy-ejection fraction 50% on prior echo, suspect most likely secondary to atrial fibrillation and try to get him back in a sinus rhythm.    Plan  1.  Start Eliquis 5 mg p.o. twice daily.  2.  Start flecainide 50 mg p.o. twice daily.  3.  Cardioversion, if unsuccessful refer on for A. fib ablation.  4.  Ideally do not like to use flecainide with diminished ejection fraction but given low blood pressure a little hesitant to use sotalol and also given shakiness due to A. fib very hesitant to use amiodarone.    Subjective  CC: 65-year-old white gentleman here for follow-up, has not seen me in almost 2 years.  He tells me over the last year he has had more orthostatic lightheaded spells, getting up in the middle of the night as well as when he feels his heart race.  He does feel his heart racing more often, has palpitations.  He feels jittery and shaky all over when he gets these.  He tells me that at times he feels more lightheaded, and has increased fatigue.    Medications  Current Outpatient Medications   Medication Sig   ? PROTEIN SUPPLEMENT (PROTEIN ORAL) Take by mouth daily.   ? sildenafil, antihypertensive, (REVATIO) 20 mg  "tablet sildenafil (antihypertensive) 20 mg tablet   ? valACYclovir (VALTREX) 500 MG tablet as needed.    ? ZOLMitriptan (ZOMIG) 5 MG tablet Take 5 mg by mouth as needed for migraine.   ? zolpidem (AMBIEN) 10 mg tablet zolpidem 10 mg tablet       Objective  BP 96/50 (Patient Site: Left Arm, Patient Position: Sitting, Cuff Size: Adult Regular)   Pulse 88   Resp 16   Ht 6' 3\" (1.905 m)   Wt 187 lb (84.8 kg)   BMI 23.37 kg/m      General Appearance:    Alert, cooperative, no distress, appears stated age   Head:    Normocephalic, without obvious abnormality, atraumatic   Throat:   Lips, mucosa, and tongue normal; teeth and gums normal   Neck:   Supple, symmetrical, trachea midline, no adenopathy;        thyroid:  No enlargement/tenderness/nodules; no carotid    bruit or JVD   Back:     Symmetric, no curvature, ROM normal, no CVA tenderness   Lungs:     Clear to auscultation bilaterally, respirations unlabored   Chest wall:    No tenderness or deformity   Heart:   Irregularly irregular, S1 and S2 normal, no murmur, rub   or gallop   Abdomen:     Soft, non-tender, bowel sounds active all four quadrants,     no masses, no organomegaly   Extremities:   Normal, atraumatic, no cyanosis or edema   Pulses:   2+ and symmetric all extremities   Skin:   Skin color, texture, turgor normal, no rashes or lesions     Results    Lab Results personally reviewed   Lab Results   Component Value Date    CHOL 160 12/20/2019    CHOL 151 12/12/2018     Lab Results   Component Value Date    HDL 46 12/20/2019    HDL 54 12/12/2018     Lab Results   Component Value Date    LDLCALC 71 12/20/2019    LDLCALC 83 12/12/2018     Lab Results   Component Value Date    TRIG 213 (H) 12/20/2019    TRIG 68 12/12/2018     Lab Results   Component Value Date    WBC 6.2 12/20/2019    HGB 15.8 12/20/2019    HCT 47.5 12/20/2019     12/20/2019     Lab Results   Component Value Date    CREATININE 1.01 12/20/2019    BUN 21 12/20/2019     12/20/2019 "    K 5.1 (H) 12/20/2019    CO2 29 12/20/2019     Reviewed electrocardiogram atrial fibrillation, low voltage, nonspecific ST-T wave changes.    Review of Systems:   General: WNL  Eyes: WNL  Ears/Nose/Throat: WNL  Lungs: WNL  Heart: Chest Pain  Stomach: WNL  Bladder: Frequent Urination at Night  Muscle/Joints: WNL  Skin: WNL  Nervous System: WNL  Mental Health: WNL     Blood: WNL

## 2022-05-26 ENCOUNTER — LAB REQUISITION (OUTPATIENT)
Dept: LAB | Facility: CLINIC | Age: 67
End: 2022-05-26

## 2022-05-26 DIAGNOSIS — E78.1 PURE HYPERGLYCERIDEMIA: ICD-10-CM

## 2022-05-26 DIAGNOSIS — Z12.5 ENCOUNTER FOR SCREENING FOR MALIGNANT NEOPLASM OF PROSTATE: ICD-10-CM

## 2022-05-26 LAB
ALBUMIN SERPL-MCNC: 3.6 G/DL (ref 3.5–5)
ALP SERPL-CCNC: 89 U/L (ref 45–120)
ALT SERPL W P-5'-P-CCNC: 18 U/L (ref 0–45)
ANION GAP SERPL CALCULATED.3IONS-SCNC: 8 MMOL/L (ref 5–18)
AST SERPL W P-5'-P-CCNC: 23 U/L (ref 0–40)
BILIRUB SERPL-MCNC: 2.2 MG/DL (ref 0–1)
BUN SERPL-MCNC: 19 MG/DL (ref 8–22)
CALCIUM SERPL-MCNC: 9.2 MG/DL (ref 8.5–10.5)
CHLORIDE BLD-SCNC: 106 MMOL/L (ref 98–107)
CHOLEST SERPL-MCNC: 170 MG/DL
CO2 SERPL-SCNC: 27 MMOL/L (ref 22–31)
CREAT SERPL-MCNC: 0.96 MG/DL (ref 0.7–1.3)
FASTING STATUS PATIENT QL REPORTED: ABNORMAL
GFR SERPL CREATININE-BSD FRML MDRD: 87 ML/MIN/1.73M2
GLUCOSE BLD-MCNC: 75 MG/DL (ref 70–125)
HDLC SERPL-MCNC: 52 MG/DL
LDLC SERPL CALC-MCNC: 85 MG/DL
POTASSIUM BLD-SCNC: 4.6 MMOL/L (ref 3.5–5)
PROT SERPL-MCNC: 6 G/DL (ref 6–8)
PSA SERPL-MCNC: 0.32 UG/L (ref 0–4.5)
SODIUM SERPL-SCNC: 141 MMOL/L (ref 136–145)
TRIGL SERPL-MCNC: 166 MG/DL

## 2022-05-26 PROCEDURE — 80061 LIPID PANEL: CPT | Performed by: FAMILY MEDICINE

## 2022-05-26 PROCEDURE — 80053 COMPREHEN METABOLIC PANEL: CPT | Performed by: FAMILY MEDICINE

## 2022-05-26 PROCEDURE — G0103 PSA SCREENING: HCPCS | Performed by: FAMILY MEDICINE

## 2023-04-12 ENCOUNTER — APPOINTMENT (RX ONLY)
Dept: URBAN - METROPOLITAN AREA CLINIC 147 | Facility: CLINIC | Age: 68
Setting detail: DERMATOLOGY
End: 2023-04-12

## 2023-04-12 DIAGNOSIS — B35.1 TINEA UNGUIUM: ICD-10-CM

## 2023-04-12 DIAGNOSIS — L81.4 OTHER MELANIN HYPERPIGMENTATION: ICD-10-CM

## 2023-04-12 DIAGNOSIS — D18.0 HEMANGIOMA: ICD-10-CM

## 2023-04-12 DIAGNOSIS — D22 MELANOCYTIC NEVI: ICD-10-CM

## 2023-04-12 DIAGNOSIS — Z71.89 OTHER SPECIFIED COUNSELING: ICD-10-CM

## 2023-04-12 DIAGNOSIS — L57.0 ACTINIC KERATOSIS: ICD-10-CM

## 2023-04-12 DIAGNOSIS — L82.1 OTHER SEBORRHEIC KERATOSIS: ICD-10-CM

## 2023-04-12 PROBLEM — D18.01 HEMANGIOMA OF SKIN AND SUBCUTANEOUS TISSUE: Status: ACTIVE | Noted: 2023-04-12

## 2023-04-12 PROBLEM — D22.5 MELANOCYTIC NEVI OF TRUNK: Status: ACTIVE | Noted: 2023-04-12

## 2023-04-12 PROCEDURE — 17000 DESTRUCT PREMALG LESION: CPT

## 2023-04-12 PROCEDURE — 99203 OFFICE O/P NEW LOW 30 MIN: CPT | Mod: 25

## 2023-04-12 PROCEDURE — ? PRESCRIPTION

## 2023-04-12 PROCEDURE — ? COUNSELING

## 2023-04-12 PROCEDURE — 17003 DESTRUCT PREMALG LES 2-14: CPT

## 2023-04-12 PROCEDURE — ? LIQUID NITROGEN

## 2023-04-12 ASSESSMENT — LOCATION DETAILED DESCRIPTION DERM
LOCATION DETAILED: LEFT INFERIOR MEDIAL MIDBACK
LOCATION DETAILED: RIGHT LATERAL SUPERIOR CHEST
LOCATION DETAILED: RIGHT SUPERIOR MEDIAL FOREHEAD
LOCATION DETAILED: LEFT SUPERIOR OCCIPITAL SCALP
LOCATION DETAILED: LEFT SUPERIOR POSTERIOR HELIX
LOCATION DETAILED: RIGHT POSTERIOR SHOULDER
LOCATION DETAILED: RIGHT SUPERIOR LATERAL FOREHEAD
LOCATION DETAILED: LEFT INFERIOR MEDIAL UPPER BACK
LOCATION DETAILED: RIGHT SUPERIOR FOREHEAD
LOCATION DETAILED: RIGHT UPPER CUTANEOUS LIP
LOCATION DETAILED: LEFT DISTAL PLANTAR GREAT TOE
LOCATION DETAILED: LEFT SUPERIOR MEDIAL UPPER BACK
LOCATION DETAILED: RIGHT MEDIAL UPPER BACK
LOCATION DETAILED: LEFT MEDIAL FRONTAL SCALP
LOCATION DETAILED: INFERIOR THORACIC SPINE

## 2023-04-12 ASSESSMENT — LOCATION SIMPLE DESCRIPTION DERM
LOCATION SIMPLE: LEFT UPPER BACK
LOCATION SIMPLE: RIGHT LIP
LOCATION SIMPLE: LEFT OCCIPITAL SCALP
LOCATION SIMPLE: LEFT GREAT TOE
LOCATION SIMPLE: RIGHT SHOULDER
LOCATION SIMPLE: RIGHT UPPER BACK
LOCATION SIMPLE: LEFT SCALP
LOCATION SIMPLE: LEFT LOWER BACK
LOCATION SIMPLE: UPPER BACK
LOCATION SIMPLE: CHEST
LOCATION SIMPLE: LEFT EAR
LOCATION SIMPLE: RIGHT FOREHEAD

## 2023-04-12 ASSESSMENT — LOCATION ZONE DERM
LOCATION ZONE: EAR
LOCATION ZONE: LIP
LOCATION ZONE: ARM
LOCATION ZONE: SCALP
LOCATION ZONE: TOE
LOCATION ZONE: TRUNK
LOCATION ZONE: FACE

## 2024-04-17 ENCOUNTER — APPOINTMENT (RX ONLY)
Dept: URBAN - METROPOLITAN AREA CLINIC 147 | Facility: CLINIC | Age: 69
Setting detail: DERMATOLOGY
End: 2024-04-17

## 2024-04-17 DIAGNOSIS — L57.0 ACTINIC KERATOSIS: ICD-10-CM

## 2024-04-17 DIAGNOSIS — B35.1 TINEA UNGUIUM: ICD-10-CM

## 2024-04-17 PROBLEM — D48.5 NEOPLASM OF UNCERTAIN BEHAVIOR OF SKIN: Status: ACTIVE | Noted: 2024-04-17

## 2024-04-17 PROCEDURE — 17000 DESTRUCT PREMALG LESION: CPT | Mod: 59

## 2024-04-17 PROCEDURE — ? BIOPSY BY SHAVE METHOD

## 2024-04-17 PROCEDURE — 11102 TANGNTL BX SKIN SINGLE LES: CPT

## 2024-04-17 PROCEDURE — ? LIQUID NITROGEN

## 2024-04-17 PROCEDURE — 99212 OFFICE O/P EST SF 10 MIN: CPT | Mod: 25

## 2024-04-17 PROCEDURE — 17003 DESTRUCT PREMALG LES 2-14: CPT

## 2024-04-17 PROCEDURE — ? DEFER

## 2024-04-17 PROCEDURE — ? COUNSELING

## 2024-04-17 ASSESSMENT — LOCATION ZONE DERM
LOCATION ZONE: TOENAIL
LOCATION ZONE: FACE
LOCATION ZONE: ARM

## 2024-04-17 ASSESSMENT — LOCATION DETAILED DESCRIPTION DERM
LOCATION DETAILED: RIGHT FOREHEAD
LOCATION DETAILED: RIGHT CENTRAL ZYGOMA
LOCATION DETAILED: RIGHT LATERAL DISTAL UPPER ARM
LOCATION DETAILED: RIGHT SUPERIOR FOREHEAD
LOCATION DETAILED: LEFT GREAT TOENAIL
LOCATION DETAILED: RIGHT GREAT TOENAIL

## 2024-04-17 ASSESSMENT — LOCATION SIMPLE DESCRIPTION DERM
LOCATION SIMPLE: RIGHT GREAT TOE
LOCATION SIMPLE: RIGHT UPPER ARM
LOCATION SIMPLE: LEFT GREAT TOE
LOCATION SIMPLE: RIGHT ZYGOMA
LOCATION SIMPLE: RIGHT FOREHEAD

## 2024-04-17 NOTE — PROCEDURE: DEFER
X Size Of Lesion In Cm (Optional): 0
Detail Level: Detailed
Introduction Text (Please End With A Colon): The following procedure was deferred: terbinafine

## 2024-04-17 NOTE — PROCEDURE: LIQUID NITROGEN
Render Note In Bullet Format When Appropriate: No
Show Aperture Variable?: Yes
Consent: The patient's consent was obtained including but not limited to risks of crusting, scabbing, blistering, scarring, darker or lighter pigmentary change, recurrence, incomplete removal and infection.
Duration Of Freeze Thaw-Cycle (Seconds): 5
Post-Care Instructions: I reviewed with the patient in detail post-care instructions. Patient is to wear sunprotection, and avoid picking at any of the treated lesions. Pt may apply Vaseline to crusted or scabbing areas.
Number Of Freeze-Thaw Cycles: 2 freeze-thaw cycles
Detail Level: Detailed

## 2024-04-22 ENCOUNTER — APPOINTMENT (RX ONLY)
Dept: URBAN - METROPOLITAN AREA CLINIC 147 | Facility: CLINIC | Age: 69
Setting detail: DERMATOLOGY
End: 2024-04-22

## 2024-04-22 ENCOUNTER — RX ONLY (OUTPATIENT)
Age: 69
Setting detail: RX ONLY
End: 2024-04-22

## 2024-04-22 ENCOUNTER — APPOINTMENT (RX ONLY)
Dept: URBAN - METROPOLITAN AREA CLINIC 122 | Facility: CLINIC | Age: 69
Setting detail: DERMATOLOGY
End: 2024-04-22

## 2024-04-22 DIAGNOSIS — B35.1 TINEA UNGUIUM: ICD-10-CM

## 2024-04-22 PROCEDURE — ? DEFER

## 2024-04-22 PROCEDURE — ? COUNSELING

## 2024-04-22 RX ORDER — TERBINAFINE HYDROCHLORIDE 250 MG/1
TABLET ORAL
Qty: 42 | Refills: 1 | Status: ERX | COMMUNITY
Start: 2024-04-22

## 2024-04-22 ASSESSMENT — LOCATION SIMPLE DESCRIPTION DERM
LOCATION SIMPLE: RIGHT GREAT TOE
LOCATION SIMPLE: RIGHT GREAT TOE
LOCATION SIMPLE: LEFT GREAT TOE
LOCATION SIMPLE: LEFT GREAT TOE

## 2024-04-22 ASSESSMENT — LOCATION ZONE DERM
LOCATION ZONE: TOENAIL
LOCATION ZONE: TOENAIL

## 2024-04-22 ASSESSMENT — LOCATION DETAILED DESCRIPTION DERM
LOCATION DETAILED: RIGHT GREAT TOENAIL
LOCATION DETAILED: LEFT GREAT TOENAIL
LOCATION DETAILED: RIGHT GREAT TOENAIL
LOCATION DETAILED: LEFT GREAT TOENAIL

## 2024-05-01 ENCOUNTER — APPOINTMENT (RX ONLY)
Dept: URBAN - METROPOLITAN AREA CLINIC 147 | Facility: CLINIC | Age: 69
Setting detail: DERMATOLOGY
End: 2024-05-01

## 2024-05-01 DIAGNOSIS — B35.1 TINEA UNGUIUM: ICD-10-CM

## 2024-05-01 PROCEDURE — ? ORDER TESTS

## 2024-05-01 NOTE — PROCEDURE: ORDER TESTS
Bill For Surgical Tray: no
Billing Type: Third-Party Bill
Performing Laboratory: 0
Expected Date Of Service: 05/01/2024

## 2024-06-18 ENCOUNTER — APPOINTMENT (RX ONLY)
Dept: URBAN - METROPOLITAN AREA CLINIC 147 | Facility: CLINIC | Age: 69
Setting detail: DERMATOLOGY
End: 2024-06-18

## 2024-06-18 DIAGNOSIS — L60.3 NAIL DYSTROPHY: ICD-10-CM

## 2024-06-18 PROBLEM — C44.619 BASAL CELL CARCINOMA OF SKIN OF LEFT UPPER LIMB, INCLUDING SHOULDER: Status: ACTIVE | Noted: 2024-06-18

## 2024-06-18 PROCEDURE — 17261 DSTRJ MAL LES T/A/L .6-1.0CM: CPT

## 2024-06-18 PROCEDURE — ? CURETTAGE AND DESTRUCTION

## 2024-06-18 PROCEDURE — 99212 OFFICE O/P EST SF 10 MIN: CPT | Mod: 25

## 2024-06-18 PROCEDURE — ? PRESCRIPTION

## 2024-06-18 PROCEDURE — ? COUNSELING

## 2024-06-18 RX ORDER — CLOBETASOL PROPIONATE 0.5 MG/G
OINTMENT TOPICAL
Qty: 45 | Refills: 0 | Status: ERX | COMMUNITY
Start: 2024-06-18

## 2024-06-18 RX ADMIN — CLOBETASOL PROPIONATE: 0.5 OINTMENT TOPICAL at 00:00

## 2024-06-18 ASSESSMENT — LOCATION ZONE DERM: LOCATION ZONE: FINGERNAIL

## 2024-06-18 ASSESSMENT — LOCATION DETAILED DESCRIPTION DERM: LOCATION DETAILED: LEFT MIDDLE FINGERNAIL

## 2024-06-18 ASSESSMENT — LOCATION SIMPLE DESCRIPTION DERM: LOCATION SIMPLE: LEFT MIDDLE FINGER

## 2024-06-18 NOTE — PROCEDURE: CURETTAGE AND DESTRUCTION
Detail Level: Simple
Accession # (Optional): [id number]
Number Of Curettages: 3
Size Of Lesion In Cm: 0.8
Size Of Lesion After Curettage: 1
Add Intralesional Injection: No
Concentration (Mg/Ml Or Millions Of Plaque Forming Units/Cc): 0.01
Anesthesia Type: 1% lidocaine with epinephrine and a 1:10 solution of 8.4% sodium bicarbonate
Cautery Type: electrodesiccation
What Was Performed First?: Curettage
Final Size Statement: The size of the lesion after curettage was
Additional Information: (Optional): The wound was cleaned, and a pressure dressing was applied.  The patient received detailed post-op instructions.
Consent was obtained from the patient. The risks, benefits and alternatives to therapy were discussed in detail. Specifically, the risks of infection, scarring, bleeding, prolonged wound healing, nerve injury, incomplete removal, allergy to anesthesia and recurrence were addressed. Alternatives to ED&C, such as: surgical removal and XRT were also discussed.  Prior to the procedure, the treatment site was clearly identified and confirmed by the patient. All components of Universal Protocol/PAUSE Rule completed.
Post-Care Instructions: I reviewed with the patient in detail post-care instructions. Patient is to keep the area dry for 48 hours, and not to engage in any swimming until the area is healed. Should the patient develop any fevers, chills, bleeding, severe pain patient will contact the office immediately.
Bill As A Line Item Or As Units: Line Item

## 2024-06-21 ENCOUNTER — APPOINTMENT (RX ONLY)
Dept: URBAN - METROPOLITAN AREA CLINIC 147 | Facility: CLINIC | Age: 69
Setting detail: DERMATOLOGY
End: 2024-06-21

## 2025-08-19 ENCOUNTER — APPOINTMENT (OUTPATIENT)
Dept: URBAN - METROPOLITAN AREA CLINIC 147 | Facility: CLINIC | Age: 70
Setting detail: DERMATOLOGY
End: 2025-08-19

## 2025-08-19 DIAGNOSIS — D22 MELANOCYTIC NEVI: ICD-10-CM

## 2025-08-19 DIAGNOSIS — L81.4 OTHER MELANIN HYPERPIGMENTATION: ICD-10-CM

## 2025-08-19 DIAGNOSIS — L57.0 ACTINIC KERATOSIS: ICD-10-CM

## 2025-08-19 DIAGNOSIS — Z85.828 PERSONAL HISTORY OF OTHER MALIGNANT NEOPLASM OF SKIN: ICD-10-CM

## 2025-08-19 DIAGNOSIS — D18.0 HEMANGIOMA: ICD-10-CM

## 2025-08-19 DIAGNOSIS — Z71.89 OTHER SPECIFIED COUNSELING: ICD-10-CM

## 2025-08-19 DIAGNOSIS — L82.1 OTHER SEBORRHEIC KERATOSIS: ICD-10-CM

## 2025-08-19 PROBLEM — D18.01 HEMANGIOMA OF SKIN AND SUBCUTANEOUS TISSUE: Status: ACTIVE | Noted: 2025-08-19

## 2025-08-19 PROBLEM — D22.5 MELANOCYTIC NEVI OF TRUNK: Status: ACTIVE | Noted: 2025-08-19

## 2025-08-19 PROCEDURE — ? LIQUID NITROGEN

## 2025-08-19 PROCEDURE — ? COUNSELING

## 2025-08-19 PROCEDURE — ? SUNSCREEN RECOMMENDATIONS

## 2025-08-19 ASSESSMENT — LOCATION SIMPLE DESCRIPTION DERM
LOCATION SIMPLE: RIGHT CHEEK
LOCATION SIMPLE: ABDOMEN
LOCATION SIMPLE: UPPER BACK
LOCATION SIMPLE: RIGHT ZYGOMA
LOCATION SIMPLE: RIGHT HAND
LOCATION SIMPLE: CHEST
LOCATION SIMPLE: LEFT UPPER ARM
LOCATION SIMPLE: LEFT HAND
LOCATION SIMPLE: LEFT NOSE

## 2025-08-19 ASSESSMENT — LOCATION DETAILED DESCRIPTION DERM
LOCATION DETAILED: LEFT NASAL ALA
LOCATION DETAILED: RIGHT SUPERIOR MEDIAL MALAR CHEEK
LOCATION DETAILED: RIGHT MEDIAL ZYGOMA
LOCATION DETAILED: MIDDLE STERNUM
LOCATION DETAILED: LEFT LATERAL DISTAL UPPER ARM
LOCATION DETAILED: SUPERIOR THORACIC SPINE
LOCATION DETAILED: LEFT ULNAR DORSAL HAND
LOCATION DETAILED: RIGHT RADIAL DORSAL HAND
LOCATION DETAILED: LOWER STERNUM
LOCATION DETAILED: XIPHOID

## 2025-08-19 ASSESSMENT — LOCATION ZONE DERM
LOCATION ZONE: TRUNK
LOCATION ZONE: HAND
LOCATION ZONE: FACE
LOCATION ZONE: NOSE
LOCATION ZONE: ARM

## (undated) DEVICE — BNDG ELASTIC 4"X5YDS UNSTERILE 6611-40

## (undated) DEVICE — GLOVE PROTEXIS POWDER FREE 6.5 ORTHOPEDIC 2D73ET65

## (undated) DEVICE — DRAPE STERI TOWEL LG 1010

## (undated) DEVICE — CAST PLASTER SPLINT 5X30" 7395

## (undated) DEVICE — SYR 10ML FINGER CONTROL W/O NDL 309695

## (undated) DEVICE — DRSG GAUZE 4X4" 3033

## (undated) DEVICE — SU ETHILON 4-0 PC-3 18" 1864G

## (undated) DEVICE — DRSG KERLIX 4 1/2"X4YDS ROLL 6715

## (undated) DEVICE — SOL NACL 0.9% IRRIG 1000ML BOTTLE 07138-09

## (undated) DEVICE — BLADE KNIFE SURG 15 371115

## (undated) DEVICE — DRSG XEROFORM 1X8"

## (undated) DEVICE — NDL 25GA 1.5" 305127

## (undated) DEVICE — PREP CHLORAPREP 26ML TINTED ORANGE  260815

## (undated) DEVICE — BNDG KLING 3" 2232

## (undated) DEVICE — DRAPE MINI C-ARM 4003

## (undated) DEVICE — IMM LIMB ELEVATOR DC40-0203

## (undated) DEVICE — SUCTION CANISTER MEDIVAC LINER 3000ML W/LID 65651-530

## (undated) DEVICE — LINEN TOWEL PACK X5 5464

## (undated) DEVICE — PACK EXTREMITY SOP15EXFSD

## (undated) DEVICE — SU VICRYL 3-0 PS-2 18" UND J497G

## (undated) RX ORDER — LIDOCAINE HYDROCHLORIDE 20 MG/ML
INJECTION, SOLUTION EPIDURAL; INFILTRATION; INTRACAUDAL; PERINEURAL
Status: DISPENSED
Start: 2017-12-26

## (undated) RX ORDER — DEXAMETHASONE SODIUM PHOSPHATE 4 MG/ML
INJECTION, SOLUTION INTRA-ARTICULAR; INTRALESIONAL; INTRAMUSCULAR; INTRAVENOUS; SOFT TISSUE
Status: DISPENSED
Start: 2017-12-26

## (undated) RX ORDER — ACETAMINOPHEN 500 MG
TABLET ORAL
Status: DISPENSED
Start: 2017-12-26

## (undated) RX ORDER — PROPOFOL 10 MG/ML
INJECTION, EMULSION INTRAVENOUS
Status: DISPENSED
Start: 2017-12-26

## (undated) RX ORDER — FENTANYL CITRATE 50 UG/ML
INJECTION, SOLUTION INTRAMUSCULAR; INTRAVENOUS
Status: DISPENSED
Start: 2017-12-26

## (undated) RX ORDER — CEFAZOLIN SODIUM 2 G/100ML
INJECTION, SOLUTION INTRAVENOUS
Status: DISPENSED
Start: 2017-12-26

## (undated) RX ORDER — ONDANSETRON 2 MG/ML
INJECTION INTRAMUSCULAR; INTRAVENOUS
Status: DISPENSED
Start: 2017-12-26